# Patient Record
Sex: FEMALE | Race: WHITE | NOT HISPANIC OR LATINO | Employment: OTHER | URBAN - METROPOLITAN AREA
[De-identification: names, ages, dates, MRNs, and addresses within clinical notes are randomized per-mention and may not be internally consistent; named-entity substitution may affect disease eponyms.]

---

## 2022-01-13 ENCOUNTER — PATIENT ROUNDING (BHMG ONLY) (OUTPATIENT)
Dept: FAMILY MEDICINE CLINIC | Facility: CLINIC | Age: 52
End: 2022-01-13

## 2022-01-13 ENCOUNTER — OFFICE VISIT (OUTPATIENT)
Dept: FAMILY MEDICINE CLINIC | Facility: CLINIC | Age: 52
End: 2022-01-13

## 2022-01-13 VITALS
TEMPERATURE: 98.7 F | HEIGHT: 59 IN | DIASTOLIC BLOOD PRESSURE: 62 MMHG | HEART RATE: 70 BPM | BODY MASS INDEX: 23.18 KG/M2 | WEIGHT: 115 LBS | SYSTOLIC BLOOD PRESSURE: 122 MMHG | OXYGEN SATURATION: 96 %

## 2022-01-13 DIAGNOSIS — M41.05 INFANTILE IDIOPATHIC SCOLIOSIS OF THORACOLUMBAR REGION: ICD-10-CM

## 2022-01-13 DIAGNOSIS — E03.9 HYPOTHYROIDISM, UNSPECIFIED TYPE: ICD-10-CM

## 2022-01-13 DIAGNOSIS — K58.0 IRRITABLE BOWEL SYNDROME WITH DIARRHEA: ICD-10-CM

## 2022-01-13 DIAGNOSIS — Z76.89 ESTABLISHING CARE WITH NEW DOCTOR, ENCOUNTER FOR: ICD-10-CM

## 2022-01-13 DIAGNOSIS — E53.8 VITAMIN B 12 DEFICIENCY: ICD-10-CM

## 2022-01-13 DIAGNOSIS — F41.9 ANXIETY: Primary | ICD-10-CM

## 2022-01-13 PROCEDURE — 99204 OFFICE O/P NEW MOD 45 MIN: CPT | Performed by: NURSE PRACTITIONER

## 2022-01-13 RX ORDER — DICYCLOMINE HYDROCHLORIDE 10 MG/1
10 CAPSULE ORAL
COMMUNITY

## 2022-01-13 RX ORDER — LEVOTHYROXINE SODIUM 0.05 MG/1
50 TABLET ORAL DAILY
COMMUNITY
End: 2022-01-14 | Stop reason: DRUGHIGH

## 2022-01-13 RX ORDER — BUSPIRONE HYDROCHLORIDE 5 MG/1
5 TABLET ORAL 3 TIMES DAILY
Qty: 90 TABLET | Refills: 0 | Status: SHIPPED | OUTPATIENT
Start: 2022-01-13 | End: 2022-03-24 | Stop reason: SDUPTHER

## 2022-01-13 NOTE — ASSESSMENT & PLAN NOTE
Long history of hypothyroidism. Reports she has been on 50 mcg of Synthroid for several years. We will assess TSH, T3, T4.

## 2022-01-13 NOTE — ASSESSMENT & PLAN NOTE
Starting patient on BuSpar 5 mg 3 times daily. Instructed her to call [facility] and set up an appointment for counseling. Patient to follow back up in 4 weeks or sooner if needed

## 2022-01-13 NOTE — ASSESSMENT & PLAN NOTE
Patient has a history of vitamin B12 deficiency. We will assess that level and offer treatment as indicated

## 2022-01-13 NOTE — PROGRESS NOTES
January 13, 2022    Hello, may I speak with Lizzie RANGEL?    My name is Jie      I am  with ANAI PALM  [facility]  [address]  [place] [address].    Before we get started may I verify your date of birth? 1970    I am calling to officially welcome you to our practice and ask about your recent visit. Is this a good time to talk? yes    Tell me about your visit with us. What things went well?  Everything went well.  It was a good visit.       We're always looking for ways to make our patients' experiences even better. Do you have recommendations on ways we may improve?  no     Overall were you satisfied with your first visit to our practice? yes       I appreciate you taking the time to speak with me today. Is there anything else I can do for you? no      Thank you, and have a great day.

## 2022-01-13 NOTE — PROGRESS NOTES
"Chief Complaint  Mental Health Problem    Subjective          Lizzie RANGEL presents to [facility]      Lizzie is a 51 year old female patient who is here to establish care. She has a history of urinary incontinence, hypothyroidism, IBS, hemorrhoids, and headaches. She had a hysterectomy around ,  in , and hernia repair in  or . She is disabled and has been so since an infant. 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 She reports she was 24-hour care and home until she was about 4 years old. Family history is noted in the chart, she is up-to-date on her flu vaccine. She had a colonoscopy in  when she was in [place]. She has genital herpes. She has not had an outbreak in years. Has a hx of shingles. She gets frequent UTIs due to her frequent lose bowels. 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    Lizzie reports that she left [place] to come here after her relationship ended. She is now living with her mother. She hasn't lived with her since she was 16 years old. She is having some issues with being around her mother and things she thought \"were put to rest\" from her childhood are coming back to her.  Pt is tearful. Anxiety is \"up to hear\". She is \"irritable, angry, having panic attacks\". She gets headaches when she is anxious due to having a trach and tachypnea when anxiety is present. She does report palpitations on occasion. Hep C hx and \"negative since \" - unsure how it was contracted however, she had numerous blood transfusions as an infant, tattoos, etc.    She denies depression, SI, self harm, or want to harm others. She has taken depression medication in the past for her anxiety however, the depression worse. She wants to avoid depression medication all together. She did attend therapy after this and was doing well until she moved in with her mother. She has tried " "Wellbutrin beforet. She did not like how it made her feel. She is seeking a referral to [facility] to set up counseling sessions. She enjoyed going to counseling in the past as it helped her learn good behaviors and techniques to manage her anxiety. She is also seeking medication to help with the symptoms of anxiety but wants to avoid anything that can be habit-forming.    Her thyroid has been under good control in years prior according to patient. She denies any hair loss or heat/cold intolerances.    IBS is fairly frequent but well controlled with dicyclomine. She has no complaints for IBS today.      Objective     Vital Signs:   /62 (BP Location: Left arm, Patient Position: Sitting, Cuff Size: Adult)   Pulse 70   Temp 98.7 °F (37.1 °C) (Oral)   Ht 149.9 cm (59\")   Wt 52.2 kg (115 lb)   SpO2 96%   BMI 23.23 kg/m²           Physical Exam  Vitals reviewed.   Constitutional:       Appearance: Normal appearance. She is well-developed.      Comments: Wearing a face mask     HENT:      Head: Normocephalic and atraumatic.      Nose: Nose normal.   Eyes:      Conjunctiva/sclera: Conjunctivae normal.   Neck:      Thyroid: No thyroid mass, thyromegaly or thyroid tenderness.     Cardiovascular:      Rate and Rhythm: Normal rate and regular rhythm.      Pulses: Normal pulses.      Heart sounds: Normal heart sounds.   Pulmonary:      Effort: Pulmonary effort is normal. No respiratory distress.      Breath sounds: No wheezing, rhonchi or rales.   Abdominal:      General: Abdomen is flat. A surgical scar is present. Bowel sounds are normal.      Tenderness: There is no abdominal tenderness.   Musculoskeletal:         General: Normal range of motion.      Cervical back: Normal range of motion and neck supple. No edema or erythema.   Lymphadenopathy:      Head:      Right side of head: No submental, submandibular, tonsillar, preauricular, posterior auricular or occipital adenopathy.      Left side of head: No " submental, submandibular, tonsillar, preauricular, posterior auricular or occipital adenopathy.      Cervical: No cervical adenopathy.      Right cervical: No superficial, deep or posterior cervical adenopathy.     Left cervical: No superficial, deep or posterior cervical adenopathy.   Skin:     General: Skin is warm and dry.      Findings: No rash.   Neurological:      General: No focal deficit present.      Mental Status: She is alert and oriented to person, place, and time.   Psychiatric:         Behavior: Behavior normal.        Result Review :                       Assessment and Plan      Diagnoses and all orders for this visit:    1. Anxiety (Primary)  Assessment & Plan:  Starting patient on BuSpar 5 mg 3 times daily. Instructed her to [facility] and set up an appointment for counseling. Patient to follow back up in 4 weeks or sooner if needed    Orders:  -     Comprehensive metabolic panel  -     CBC (No Diff)    2. Establishing care with new doctor, encounter for  -     Comprehensive metabolic panel  -     CBC (No Diff)    3. Irritable bowel syndrome with diarrhea  Assessment & Plan:  History of irritable bowel that is controlled with dicyclomine    Orders:  -     CBC (No Diff)    4. Hypothyroidism, unspecified type  Assessment & Plan:  Long history of hypothyroidism. Reports she has been on 50 mcg of Synthroid for several years. We will assess TSH, T3, T4.    Orders:  -     TSH  -     T3, free  -     T4  -     CBC (No Diff)    5. Vitamin B 12 deficiency  Assessment & Plan:  Patient has a history of vitamin B12 deficiency. We will assess that level and offer treatment as indicated    Orders:  -     Vitamin B12  -     CBC (No Diff)    6. Infantile idiopathic scoliosis of thoracolumbar region    Other orders  -     Cancel: COVID-19,[facility] ROUTINE, NP/OP SWAB IN TRANSPORT MEDIA OR ESWAB 72 HR TAT - Swab, Nasopharynx  -     busPIRone (BUSPAR) 5 MG tablet; Take 1 tablet by mouth 3 (Three) Times a Day.   Dispense: 90 tablet; Refill: 0          Follow Up       No follow-ups on file.      Patient was given instructions and counseling regarding her condition or for health maintenance advice. Please see specific information pulled into the AVS if appropriate.     Ethel Blackman, APRN1/13/202215:01 EST  This note has been electronically signed

## 2022-01-14 DIAGNOSIS — E03.9 HYPOTHYROIDISM, UNSPECIFIED TYPE: Primary | ICD-10-CM

## 2022-01-14 LAB
ALBUMIN SERPL-MCNC: 4.8 G/DL (ref 3.8–4.9)
ALBUMIN/GLOB SERPL: 1.7 {RATIO} (ref 1.2–2.2)
ALP SERPL-CCNC: 80 IU/L (ref 44–121)
ALT SERPL-CCNC: 18 IU/L (ref 0–32)
AST SERPL-CCNC: 20 IU/L (ref 0–40)
BILIRUB SERPL-MCNC: <0.2 MG/DL (ref 0–1.2)
BUN SERPL-MCNC: 16 MG/DL (ref 6–24)
BUN/CREAT SERPL: 20 (ref 9–23)
CALCIUM SERPL-MCNC: 10 MG/DL (ref 8.7–10.2)
CHLORIDE SERPL-SCNC: 103 MMOL/L (ref 96–106)
CO2 SERPL-SCNC: 25 MMOL/L (ref 20–29)
CREAT SERPL-MCNC: 0.81 MG/DL (ref 0.57–1)
ERYTHROCYTE [DISTWIDTH] IN BLOOD BY AUTOMATED COUNT: 13.4 % (ref 11.7–15.4)
GLOBULIN SER CALC-MCNC: 2.9 G/DL (ref 1.5–4.5)
GLUCOSE SERPL-MCNC: 93 MG/DL (ref 65–99)
HCT VFR BLD AUTO: 43.5 % (ref 34–46.6)
HGB BLD-MCNC: 14.7 G/DL (ref 11.1–15.9)
MCH RBC QN AUTO: 29.1 PG (ref 26.6–33)
MCHC RBC AUTO-ENTMCNC: 33.8 G/DL (ref 31.5–35.7)
MCV RBC AUTO: 86 FL (ref 79–97)
PLATELET # BLD AUTO: 341 X10E3/UL (ref 150–450)
POTASSIUM SERPL-SCNC: 4.6 MMOL/L (ref 3.5–5.2)
PROT SERPL-MCNC: 7.7 G/DL (ref 6–8.5)
RBC # BLD AUTO: 5.05 X10E6/UL (ref 3.77–5.28)
SODIUM SERPL-SCNC: 144 MMOL/L (ref 134–144)
T3FREE SERPL-MCNC: 2 PG/ML (ref 2–4.4)
T4 SERPL-MCNC: 5.2 UG/DL (ref 4.5–12)
TSH SERPL DL<=0.005 MIU/L-ACNC: 20.2 UIU/ML (ref 0.45–4.5)
VIT B12 SERPL-MCNC: 327 PG/ML (ref 232–1245)
WBC # BLD AUTO: 5.5 X10E3/UL (ref 3.4–10.8)

## 2022-01-14 RX ORDER — LEVOTHYROXINE SODIUM 0.07 MG/1
75 TABLET ORAL DAILY
Qty: 30 TABLET | Refills: 1 | Status: SHIPPED | OUTPATIENT
Start: 2022-01-14 | End: 2022-02-28

## 2022-01-14 NOTE — PROGRESS NOTES
With that I will need her to return in 6 weeks so we can check her thyroid levels.  I can place the order and she can stop by or she can have a visit and then we can check labs after the visit

## 2022-01-14 NOTE — PROGRESS NOTES
Please inform Lizzie that I am going to be increasing her Synthroid to 75 mcg daily.  Her TSH was at a level of 20.2.  Could you ask her if her levels have been this high before in the past and if she has any additional information on her hypothyroidism.

## 2022-01-14 NOTE — PROGRESS NOTES
That would be fine. I did send in the higher dose of 75 mcg to pharmacy and do prefer her to take it over the 50 mcg if you could please advise her to do so.

## 2022-02-07 ENCOUNTER — TELEPHONE (OUTPATIENT)
Dept: FAMILY MEDICINE CLINIC | Facility: CLINIC | Age: 52
End: 2022-02-07

## 2022-02-07 NOTE — TELEPHONE ENCOUNTER
HUB TO READ     LEFT MESSAGE     PATIENT NEEDS TO MAKE AN APPOINTMENT TO BE CHECKED OUT. DON'T PRESCRIBE ANTIBIOTICS USUALLY WITH OUT BEING SINCE FIRST.

## 2022-02-07 NOTE — TELEPHONE ENCOUNTER
Caller: Lizzie Campos    Relationship: Self    Best call back number: [phone]    What medication are you requesting: Z-SHAWANDA OR WHATEVER MEDICATION HER PCP RECOMMENDS    What are your current symptoms: GREEN MUCUS SECRETIONS, WINDED WHEN TALKING     How long have you been experiencing symptoms:     Have you had these symptoms before:    [x] Yes  [] No    Have you been treated for these symptoms before:   [x] Yes  [] No    If a prescription is needed, what is your preferred pharmacy and phone number: [address] [phone] [facility] [phone] [facility]       EDITED TO ADD: THE PATIENT ADDED THAT SHE IS ALSO EXPERIENCING A HEADACHE, SORE THROAT, AND CHEST CONGESTION. THE PATIENT WOULD LIKE TO KNOW IF SHE SHOULD BE TESTED FOR COVID-19.

## 2022-02-28 ENCOUNTER — OFFICE VISIT (OUTPATIENT)
Dept: FAMILY MEDICINE CLINIC | Facility: CLINIC | Age: 52
End: 2022-02-28

## 2022-02-28 VITALS
OXYGEN SATURATION: 96 % | HEIGHT: 59 IN | DIASTOLIC BLOOD PRESSURE: 77 MMHG | SYSTOLIC BLOOD PRESSURE: 118 MMHG | WEIGHT: 117.6 LBS | RESPIRATION RATE: 18 BRPM | HEART RATE: 89 BPM | TEMPERATURE: 98.6 F | BODY MASS INDEX: 23.71 KG/M2

## 2022-02-28 DIAGNOSIS — Z00.00 ENCOUNTER FOR ANNUAL WELLNESS VISIT (AWV) IN MEDICARE PATIENT: Primary | ICD-10-CM

## 2022-02-28 DIAGNOSIS — M54.9 CHRONIC BACK PAIN, UNSPECIFIED BACK LOCATION, UNSPECIFIED BACK PAIN LATERALITY: ICD-10-CM

## 2022-02-28 DIAGNOSIS — Z43.0 TRACHEOSTOMY CARE: ICD-10-CM

## 2022-02-28 DIAGNOSIS — Z11.59 ENCOUNTER FOR HEPATITIS C SCREENING TEST FOR LOW RISK PATIENT: ICD-10-CM

## 2022-02-28 DIAGNOSIS — E03.9 HYPOTHYROIDISM, UNSPECIFIED TYPE: ICD-10-CM

## 2022-02-28 DIAGNOSIS — G89.29 CHRONIC BACK PAIN, UNSPECIFIED BACK LOCATION, UNSPECIFIED BACK PAIN LATERALITY: ICD-10-CM

## 2022-02-28 DIAGNOSIS — M41.00 INFANTILE IDIOPATHIC SCOLIOSIS, UNSPECIFIED SPINAL REGION: ICD-10-CM

## 2022-02-28 DIAGNOSIS — F41.9 ANXIETY: ICD-10-CM

## 2022-02-28 PROCEDURE — 1170F FXNL STATUS ASSESSED: CPT | Performed by: NURSE PRACTITIONER

## 2022-02-28 PROCEDURE — 1159F MED LIST DOCD IN RCRD: CPT | Performed by: NURSE PRACTITIONER

## 2022-02-28 PROCEDURE — G0439 PPPS, SUBSEQ VISIT: HCPCS | Performed by: NURSE PRACTITIONER

## 2022-02-28 PROCEDURE — 96160 PT-FOCUSED HLTH RISK ASSMT: CPT | Performed by: NURSE PRACTITIONER

## 2022-02-28 RX ORDER — LEVOTHYROXINE SODIUM 88 UG/1
88 TABLET ORAL DAILY
Qty: 30 TABLET | Refills: 1 | Status: SHIPPED | OUTPATIENT
Start: 2022-02-28 | End: 2022-05-03

## 2022-02-28 NOTE — ASSESSMENT & PLAN NOTE
Patient TSH at 6.63. I am increasing her Synthroid to 88 mcg. Patient will remain on this and follow-up in 2 to 3 months. We will reevaluate her TSH then

## 2022-02-28 NOTE — PROGRESS NOTES
"The ABCs of the Annual Wellness Visit  Subsequent Medicare Wellness Visit    Chief Complaint   Patient presents with   • Medicare Wellness-subsequent   • Anxiety   • Hypothyroidism      Subjective    History of Present Illness:  Lizzie Campos is a 51 y.o. female who presents for a Subsequent Medicare Wellness Visit.    The following portions of the patient's history were reviewed and   updated as appropriate: allergies, current medications, past family history, past medical history, past social history, past surgical history and problem list.    Compared to one year ago, the patient feels her physical   health is the same.    Compared to one year ago, the patient feels her mental   health is the same.    Remodeling going on in March at her mothers house.     She did do first therapy session lat week to \"meet & greet\" and feeling out paperwork. Her next visit is at the end of March. She is going to request \"every two week visits\" to get through some things that are bothering her.     She takes BuSpar only once daily for her anxiety. However, at times she has taken a second dose as \"my mother can be difficult sometimes\". She denies any chest pain, palpitations, constipation, altered mentation, SI/HI, or want to self-harm.    She has a mammogram in 2021 - and reports no abnormalities.   She reports having a colonoscopy in the past. We will request records to find out when.     Patient has history of scoliosis and chronic back pain. She previously treated with medicinal marijuana however, now that she lives in [place] she only treats with Tylenol.      Recent Hospitalizations:  She was not admitted to the hospital during the last year.       Current Medical Providers:  Patient Care Team:  Ethel Blackman APRN as PCP - General (Nurse Practitioner)    Outpatient Medications Prior to Visit   Medication Sig Dispense Refill   • busPIRone (BUSPAR) 5 MG tablet Take 1 tablet by mouth 3 (Three) Times a Day. 90 tablet 0 "   • dicyclomine (BENTYL) 10 MG capsule Take 10 mg by mouth 4 (Four) Times a Day Before Meals & at Bedtime.     • Mirabegron ER (MYRBETRIQ) 25 MG tablet sustained-release 24 hour 24 hr tablet Take 25 mg by mouth Daily.     • levothyroxine (Synthroid) 75 MCG tablet Take 1 tablet by mouth Daily. 30 tablet 1     No facility-administered medications prior to visit.       No opioid medication identified on active medication list. I have reviewed chart for other potential  high risk medication/s and harmful drug interactions in the elderly.          Aspirin is not on active medication list.  Aspirin use is not indicated based on review of current medical condition/s. Risk of harm outweighs potential benefits.  .    Patient Active Problem List   Diagnosis   • Infantile idiopathic scoliosis of thoracolumbar region   • Anxiety   • Establishing care with new doctor, encounter for   • Vitamin B 12 deficiency   • Irritable bowel syndrome with diarrhea   • Hypothyroidism   • Encounter for annual wellness visit (AWV) in Medicare patient   • Encounter for hepatitis C screening test for low risk patient   • Tracheostomy care (HCC)   • Infantile idiopathic scoliosis   • Chronic back pain     Advance Care Planning  Advance Directive is not on file.  ACP discussion was held with the patient during this visit. Patient has an advance directive (not in EMR), copy requested.    Review of Systems   Constitutional: Negative.    HENT: Negative.    Eyes: Negative.         Wearing glasses     Respiratory: Negative.         Permanent trach     Cardiovascular: Negative.    Gastrointestinal: Positive for diarrhea.   Endocrine: Negative.    Genitourinary: Positive for frequency and urgency.   Musculoskeletal: Negative.    Skin: Negative.    Allergic/Immunologic: Negative.    Neurological: Negative.    Psychiatric/Behavioral: Negative.         Objective    Vitals:    02/28/22 1318   BP: 118/77   BP Location: Right arm   Patient Position: Sitting  "  Cuff Size: Adult   Pulse: 89   Resp: 18   Temp: 98.6 °F (37 °C)   TempSrc: Infrared   SpO2: 96%   Weight: 53.3 kg (117 lb 9.6 oz)   Height: 149.9 cm (59\")   PainSc: 0-No pain     BMI Readings from Last 1 Encounters:   02/28/22 23.75 kg/m²   BMI is within normal parameters. No follow-up required.    Does the patient have evidence of cognitive impairment? No    Physical Exam  Vitals reviewed.   Constitutional:       Appearance: Normal appearance. She is well-developed.      Comments: Wearing a face mask     HENT:      Head: Normocephalic and atraumatic.      Nose: Nose normal.      Mouth/Throat:      Mouth: Mucous membranes are moist.      Pharynx: Oropharynx is clear.   Eyes:      Conjunctiva/sclera: Conjunctivae normal.   Cardiovascular:      Rate and Rhythm: Normal rate and regular rhythm.      Pulses: Normal pulses.      Heart sounds: Normal heart sounds.   Pulmonary:      Effort: Pulmonary effort is normal.      Breath sounds: Normal breath sounds.      Comments: permament trach    Abdominal:      General: Bowel sounds are normal.      Palpations: Abdomen is soft.   Musculoskeletal:         General: Normal range of motion.      Cervical back: Normal range of motion and neck supple. No tenderness.      Thoracic back: Scoliosis present.      Lumbar back: Scoliosis present.   Skin:     General: Skin is warm and dry.      Findings: No rash.   Neurological:      Mental Status: She is alert and oriented to person, place, and time.   Psychiatric:         Behavior: Behavior normal.                 HEALTH RISK ASSESSMENT    Smoking Status:  Social History     Tobacco Use   Smoking Status Never Smoker   Smokeless Tobacco Never Used     Alcohol Consumption:  Social History     Substance and Sexual Activity   Alcohol Use Not Currently    Comment: QUIT DRINKING IN 2013     Fall Risk Screen:    Critical access hospital Fall Risk Assessment has not been completed.    Depression Screening:  PHQ-2/PHQ-9 Depression Screening 2/28/2022   Little " interest or pleasure in doing things 0   Feeling down, depressed, or hopeless 1   Total Score 1       Health Habits and Functional and Cognitive Screening:  Functional & Cognitive Status 2/28/2022   Do you have difficulty preparing food and eating? No   Do you have difficulty bathing yourself, getting dressed or grooming yourself? No   Do you have difficulty using the toilet? No   Do you have difficulty moving around from place to place? No   Do you have trouble with steps or getting out of a bed or a chair? Yes   Current Diet Limited Junk Food   Dental Exam Not up to date   Eye Exam Not up to date   Exercise (times per week) 0 times per week   Current Exercises Include No Regular Exercise   Do you need help using the phone?  No   Are you deaf or do you have serious difficulty hearing?  No   Do you need help with transportation? No   Do you need help shopping? No   Do you need help preparing meals?  No   Do you need help with housework?  No   Do you need help with laundry? Yes   Do you need help taking your medications? No   Do you need help managing money? No   Do you ever drive or ride in a car without wearing a seat belt? No   Have you felt unusual stress, anger or loneliness in the last month? Yes   Who do you live with? Other   If you need help, do you have trouble finding someone available to you? Yes   Have you been bothered in the last four weeks by sexual problems? No   Do you have difficulty concentrating, remembering or making decisions? No       Age-appropriate Screening Schedule:  Refer to the list below for future screening recommendations based on patient's age, sex and/or medical conditions. Orders for these recommended tests are listed in the plan section. The patient has been provided with a written plan.    Health Maintenance   Topic Date Due   • ZOSTER VACCINE (1 of 2) Never done   • MAMMOGRAM  06/02/2022   • TDAP/TD VACCINES (2 - Td or Tdap) 12/29/2030   • INFLUENZA VACCINE  Completed               Assessment/Plan   CMS Preventative Services Quick Reference  Risk Factors Identified During Encounter  Immunizations Discussed/Encouraged (specific Immunizations; Tdap and Zoster  Urinary Incontinence  Fecal incontinence on occassion  The above risks/problems have been discussed with the patient.  Follow up actions/plans if indicated are seen below in the Assessment/Plan Section.  Pertinent information has been shared with the patient in the After Visit Summary.    Diagnoses and all orders for this visit:    1. Encounter for annual wellness visit (AWV) in Medicare patient (Primary)    2. Hypothyroidism, unspecified type  Assessment & Plan:  Patient TSH at 6.63. I am increasing her Synthroid to 88 mcg. Patient will remain on this and follow-up in 2 to 3 months. We will reevaluate her TSH then      3. Anxiety    4. Chronic back pain, unspecified back location, unspecified back pain laterality  Assessment & Plan:  Patient has longstanding back pain. We will obtain imaging to assess for underlying issues.    Orders:  -     CT Cervical Spine Without Contrast; Future  -     CT Thoracic Spine Without Contrast; Future  -     CT Lumbar Spine Without Contrast; Future    5. Infantile idiopathic scoliosis, unspecified spinal region  Assessment & Plan:  Patient complains of generalized back pain, she has a history of scoliosis. She is requesting imaging to evaluate the progression of her disease. Patient reports it has been many years since her back has been evaluated.    Orders:  -     CT Cervical Spine Without Contrast; Future  -     CT Thoracic Spine Without Contrast; Future  -     CT Lumbar Spine Without Contrast; Future    6. Tracheostomy care (HCC)  Assessment & Plan:  Patient has permanent trach. She is requesting referral to ENT. She has no complications or issues at this time.    Orders:  -     Ambulatory Referral to ENT (Otolaryngology)    7. Encounter for hepatitis C screening test for low risk patient  Assessment  & Plan:  Patient has a history of treated hep C. We will obtain that lab today.    Orders:  -     Hepatitis C antibody    Other orders  -     levothyroxine (Synthroid) 88 MCG tablet; Take 1 tablet by mouth Daily.  Dispense: 30 tablet; Refill: 1      Follow Up:   No follow-ups on file.     An After Visit Summary and PPPS were made available to the patient.

## 2022-02-28 NOTE — ASSESSMENT & PLAN NOTE
Patient complains of generalized back pain, she has a history of scoliosis. She is requesting imaging to evaluate the progression of her disease. Patient reports it has been many years since her back has been evaluated.

## 2022-02-28 NOTE — ASSESSMENT & PLAN NOTE
Patient has permanent trach. She is requesting referral to ENT. She has no complications or issues at this time.

## 2022-03-01 ENCOUNTER — TELEPHONE (OUTPATIENT)
Dept: FAMILY MEDICINE CLINIC | Facility: CLINIC | Age: 52
End: 2022-03-01

## 2022-03-01 DIAGNOSIS — R76.8 HEPATITIS C ANTIBODY POSITIVE IN BLOOD: Primary | ICD-10-CM

## 2022-03-01 LAB — HCV AB S/CO SERPL IA: 6.8 S/CO RATIO (ref 0–0.9)

## 2022-03-01 NOTE — TELEPHONE ENCOUNTER
HUB TO READ     MY CHART MESSAGE     Hep C antibodies are positive. This only indicates she has been with hepatitis C. We will monitor for now but will check to ensure she has cleared the Hep C and it did not ever return.

## 2022-03-15 ENCOUNTER — HOSPITAL ENCOUNTER (OUTPATIENT)
Dept: CT IMAGING | Facility: HOSPITAL | Age: 52
Discharge: HOME | End: 2022-03-15
Admitting: NURSE PRACTITIONER

## 2022-03-15 DIAGNOSIS — M54.9 CHRONIC BACK PAIN, UNSPECIFIED BACK LOCATION, UNSPECIFIED BACK PAIN LATERALITY: ICD-10-CM

## 2022-03-15 DIAGNOSIS — G89.29 CHRONIC BACK PAIN, UNSPECIFIED BACK LOCATION, UNSPECIFIED BACK PAIN LATERALITY: ICD-10-CM

## 2022-03-15 DIAGNOSIS — M41.00 INFANTILE IDIOPATHIC SCOLIOSIS, UNSPECIFIED SPINAL REGION: ICD-10-CM

## 2022-03-15 PROCEDURE — 72125 CT NECK SPINE W/O DYE: CPT

## 2022-03-15 PROCEDURE — 72131 CT LUMBAR SPINE W/O DYE: CPT

## 2022-03-15 PROCEDURE — 72128 CT CHEST SPINE W/O DYE: CPT

## 2022-03-17 DIAGNOSIS — E03.9 HYPOTHYROIDISM, UNSPECIFIED TYPE: Primary | ICD-10-CM

## 2022-03-24 RX ORDER — BUSPIRONE HYDROCHLORIDE 5 MG/1
5 TABLET ORAL 3 TIMES DAILY
Qty: 90 TABLET | Refills: 0 | Status: SHIPPED | OUTPATIENT
Start: 2022-03-24 | End: 2022-04-28 | Stop reason: DRUGHIGH

## 2022-03-24 NOTE — TELEPHONE ENCOUNTER
Caller: Lizzie Campos    Relationship: Self    Best call back number: [phone] (H)    Requested Prescriptions:   Requested Prescriptions     Pending Prescriptions Disp Refills   • busPIRone (BUSPAR) 5 MG tablet 90 tablet 0     Sig: Take 1 tablet by mouth 3 (Three) Times a Day.        Pharmacy where request should be sent: [facility]/PHARMACY #[id number] [facility] [address] [facility] [address] - [phone]  - [phone] FX     Additional details provided by patient: PATIENT NEEDS REFILLED THIS ONE TIME AT THIS PHARMACY ASAP    Does the patient have less than a 3 day supply:  [x] Yes  [] No    Shyanne Bar   03/24/22 15:36 EDT

## 2022-03-28 DIAGNOSIS — E03.9 HYPOTHYROIDISM, UNSPECIFIED TYPE: Primary | ICD-10-CM

## 2022-04-27 LAB
HCV RNA SERPL NAA+PROBE-ACNC: NORMAL IU/ML
TEST INFORMATION: NORMAL

## 2022-04-28 ENCOUNTER — OFFICE VISIT (OUTPATIENT)
Dept: FAMILY MEDICINE CLINIC | Facility: CLINIC | Age: 52
End: 2022-04-28

## 2022-04-28 VITALS
OXYGEN SATURATION: 98 % | HEART RATE: 91 BPM | TEMPERATURE: 98.5 F | WEIGHT: 119.2 LBS | BODY MASS INDEX: 24.03 KG/M2 | SYSTOLIC BLOOD PRESSURE: 108 MMHG | HEIGHT: 59 IN | DIASTOLIC BLOOD PRESSURE: 68 MMHG

## 2022-04-28 DIAGNOSIS — E03.9 HYPOTHYROIDISM, UNSPECIFIED TYPE: Primary | ICD-10-CM

## 2022-04-28 DIAGNOSIS — F41.9 ANXIETY: ICD-10-CM

## 2022-04-28 PROBLEM — Z11.59 ENCOUNTER FOR HEPATITIS C SCREENING TEST FOR LOW RISK PATIENT: Status: RESOLVED | Noted: 2022-02-28 | Resolved: 2022-04-28

## 2022-04-28 PROCEDURE — 99213 OFFICE O/P EST LOW 20 MIN: CPT | Performed by: NURSE PRACTITIONER

## 2022-04-28 RX ORDER — BUSPIRONE HYDROCHLORIDE 10 MG/1
10 TABLET ORAL 3 TIMES DAILY
Qty: 90 TABLET | Refills: 2 | Status: SHIPPED | OUTPATIENT
Start: 2022-04-28 | End: 2022-10-21 | Stop reason: SDUPTHER

## 2022-04-28 NOTE — ASSESSMENT & PLAN NOTE
TSH is stable and doing well on 88 mcg.  We will have patient follow-up in 3 months for evaluation.

## 2022-04-28 NOTE — PROGRESS NOTES
"Chief Complaint  Anxiety and Hypothyroidism    Subjective          Lizzie Campos presents to [facility]        History of Present Illness    Lizzie is a 51-year-old female patient who presents today to follow-up on her anxiety and hypothyroidism.    We did draw labs a few days ago to check thyroid which has improved greatly and is now at 2.670 and TSH.  She is currently on 88 mcg of levothyroxine.  She denies any fatigue, palpitations, chest pain, heat or cold intolerances, hair loss.    Patient's anxiety was noted in January shortly after she moved in with her mother..  We started her on BuSpar 5mg 3 times a day.  She is still stressed out living with her mother that she is working on getting Medicaid and section 8.  She has an appointment on May 2 to start this process.  She reports \"I cannot live with my mother anymore \".  She indicates that they butt heads off and on using her patient lives.  Additionally, she reports her mother stresses her out and likes to cause drama in the house.  Patient will typically go to her room to calm down or remove herself from the situation however, \"I am tired of going to my room\".  She would like to increase the dose of BuSpar due to heightened anxiety after recent renovation and still working on the house.     She is still currently looking for houses that her section 8 approved.  She is worried about her animals.  She has 2 dogs 1 small and 1 large and indicates that she might need paperwork for emotional support animal for her anxiety.  I have no issue providing patient with this documentation if needed for housing situation.      Objective     Vital Signs:   /68 (BP Location: Left arm, Patient Position: Sitting, Cuff Size: Adult)   Pulse 91   Temp 98.5 °F (36.9 °C) (Oral)   Ht 149.9 cm (59.02\")   Wt 54.1 kg (119 lb 3.2 oz)   SpO2 98%   BMI 24.06 kg/m²           Physical Exam  Vitals reviewed.   Constitutional:       " Appearance: Normal appearance. She is well-developed.      Comments: Wearing a face mask     HENT:      Head: Normocephalic and atraumatic.      Nose: Nose normal.      Mouth/Throat:      Lips: Pink. No lesions.      Mouth: Mucous membranes are moist.      Pharynx: Oropharynx is clear.   Eyes:      Conjunctiva/sclera: Conjunctivae normal.   Cardiovascular:      Rate and Rhythm: Normal rate and regular rhythm.      Pulses: Normal pulses.      Heart sounds: Normal heart sounds.   Pulmonary:      Effort: Pulmonary effort is normal.      Breath sounds: Normal breath sounds.      Comments: permament trach    Abdominal:      General: Bowel sounds are normal.      Palpations: Abdomen is soft.   Musculoskeletal:         General: Normal range of motion.      Cervical back: Normal range of motion and neck supple. No tenderness.      Thoracic back: Scoliosis present.      Lumbar back: Scoliosis present.   Skin:     General: Skin is warm and dry.      Findings: No rash.   Neurological:      Mental Status: She is alert and oriented to person, place, and time.   Psychiatric:         Behavior: Behavior normal.                Result Review :                                   Assessment and Plan      Diagnoses and all orders for this visit:    1. Hypothyroidism, unspecified type (Primary)  Assessment & Plan:  TSH is stable and doing well on 88 mcg.  We will have patient follow-up in 3 months for evaluation.      2. Anxiety  Assessment & Plan:  Anxiety was doing well but has worsened with renovation and living situation with mother.  Patient is working on moving out of her mother's home and getting her own residence.  Mother is causing patient additional anxiety and stress.  She would like to go up slightly on her BuSpar      Other orders  -     busPIRone (BUSPAR) 10 MG tablet; Take 1 tablet by mouth 3 (Three) Times a Day.  Dispense: 90 tablet; Refill: 2          Follow Up       No follow-ups on file.      Patient was given  instructions and counseling regarding her condition or for health maintenance advice. Please see specific information pulled into the AVS if appropriate.     Ethel Blackman, APRN4/28/202214:29 EDT  This note has been electronically signed

## 2022-04-28 NOTE — ASSESSMENT & PLAN NOTE
Anxiety was doing well but has worsened with renovation and living situation with mother.  Patient is working on moving out of her mother's home and getting her own residence.  Mother is causing patient additional anxiety and stress.  She would like to go up slightly on her BuSpar

## 2022-05-03 RX ORDER — LEVOTHYROXINE SODIUM 88 UG/1
TABLET ORAL
Qty: 30 TABLET | Refills: 0 | Status: SHIPPED | OUTPATIENT
Start: 2022-05-03 | End: 2022-10-30 | Stop reason: SDUPTHER

## 2022-05-17 DIAGNOSIS — M79.675 PAIN OF TOE OF LEFT FOOT: Primary | ICD-10-CM

## 2022-05-19 ENCOUNTER — TELEPHONE (OUTPATIENT)
Dept: FAMILY MEDICINE CLINIC | Facility: CLINIC | Age: 52
End: 2022-05-19

## 2022-05-19 NOTE — TELEPHONE ENCOUNTER
I left detailed VM for pt, if she calls back  HUB CAN READ  Please notify Lizzie that her x-ray of the foot was normal.  I do advise her to continue alternating ibuprofen and Tylenol as well as heat and ice to the area.

## 2022-07-08 DIAGNOSIS — E03.9 HYPOTHYROIDISM, UNSPECIFIED TYPE: Primary | ICD-10-CM

## 2022-07-14 ENCOUNTER — OFFICE VISIT (OUTPATIENT)
Dept: FAMILY MEDICINE CLINIC | Facility: CLINIC | Age: 52
End: 2022-07-14

## 2022-07-14 VITALS
WEIGHT: 121.8 LBS | DIASTOLIC BLOOD PRESSURE: 76 MMHG | OXYGEN SATURATION: 98 % | BODY MASS INDEX: 24.56 KG/M2 | HEIGHT: 59 IN | TEMPERATURE: 98.6 F | HEART RATE: 86 BPM | SYSTOLIC BLOOD PRESSURE: 138 MMHG

## 2022-07-14 DIAGNOSIS — S80.12XA CONTUSION OF MULTIPLE SITES OF LEFT LOWER EXTREMITY, INITIAL ENCOUNTER: ICD-10-CM

## 2022-07-14 DIAGNOSIS — M79.605 PAIN IN LEFT LEG: Primary | ICD-10-CM

## 2022-07-14 PROCEDURE — 99213 OFFICE O/P EST LOW 20 MIN: CPT | Performed by: NURSE PRACTITIONER
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
## 2022-07-14 NOTE — ASSESSMENT & PLAN NOTE
Advised patient to continue taking Tylenol OTC.  She may also apply cold application 3-4 times daily 10 to 15 minutes at a time.

## 2022-07-14 NOTE — PROGRESS NOTES
No acute osseous abnormalities, no fractures, no dislocations.  Please advise patient to return if continuation of pain or discomfort.  Otherwise OTC Tylenol should be adequate.  Ice application can help heal the contusions quicker.

## 2022-07-14 NOTE — ASSESSMENT & PLAN NOTE
FINDINGS:  The tibia and fibula are intact. There is normal bone mineralization and  trabeculation. Normal osseous alignment. Patella intact. No joint  effusion. The medial and lateral malleoli are intact.      IMPRESSION:  Negative for acute osseous abnormality.

## 2022-07-25 ENCOUNTER — OFFICE VISIT (OUTPATIENT)
Dept: FAMILY MEDICINE CLINIC | Facility: CLINIC | Age: 52
End: 2022-07-25

## 2022-07-25 DIAGNOSIS — E03.9 HYPOTHYROIDISM, UNSPECIFIED TYPE: Primary | ICD-10-CM

## 2022-07-25 PROCEDURE — 99214 OFFICE O/P EST MOD 30 MIN: CPT | Performed by: NURSE PRACTITIONER

## 2022-07-25 NOTE — ASSESSMENT & PLAN NOTE
Patient TSH level elevated at 9.210.  She had not been taking her medication every day last month.  We will recheck her values in 4 weeks.

## 2022-07-25 NOTE — PROGRESS NOTES
Chief Complaint  Thyroid Problem    Subjective          Lizzie Campos presents to [facility]    You have chosen to receive care through a telephone visit. Do you consent to use a telephone visit for your medical care today? Yes    1320 - Call begin  1328 -  Call end  8 minutes total call    Recheck the     Below kneecap with a bump. Wants checked out when she comes back in.     She is walking fine and no pain.     History of Present Illness       Lizzie is a 51-year-old female patient who presents today via telephone encounter to follow-up on her hypothyroidism.    She is a past medical history of B12 deficiency, hypothyroidism, IBS-D, anxiety, idiopathic infantile scoliosis, and a permanent trach.    We checked her labs 7/21/2022.  Her TSH was 9.210.  She currently takes Euthyrox 88 mcg.  She does report to me that a month ago she was having issues staying consistent with her Euthyrox 88 mcg.  Patient reports she has been pretty diligent since then it has taken daily.  She is having no unwanted symptoms or side effects.  I advised her to follow-up in 1 month so we can see where her TSH is.  Patient also requesting T3 and T4 level.    She is still recovering from her lower extremity injury.  She reports that below her kneecap there is a bump still present.  No significant pain and is walking fine.  She would like me to check this when she comes back to her next visit.  Advised patient that sometimes contusions on the lower extremities can take anywhere from 4 to 8 weeks to heal.  We will take a look when she returns to the office.    Objective     Vital Signs:   There were no vitals taken for this visit.          Physical Exam     Physical exam as this was a telephone encounter.  Patient was able to communicate with ease and without pause.  She denied being in any distress.        Result Review :                                   Assessment and Plan      Diagnoses and all  orders for this visit:    1. Hypothyroidism, unspecified type (Primary)  Assessment & Plan:  Patient TSH level elevated at 9.210.  She had not been taking her medication every day last month.  We will recheck her values in 4 weeks.    Orders:  -     T3, free; Future  -     T4, free; Future  -     TSH; Future          Follow Up       No follow-ups on file.      Patient was given instructions and counseling regarding her condition or for health maintenance advice. Please see specific information pulled into the AVS if appropriate.     Ethel Blackman, APRN7/25/202213:30 EDT  This note has been electronically signed

## 2022-08-19 LAB
T3FREE SERPL-MCNC: 2.3 PG/ML (ref 2–4.4)
T4 FREE SERPL-MCNC: 1.2 NG/DL (ref 0.82–1.77)
TSH SERPL DL<=0.005 MIU/L-ACNC: 2.38 UIU/ML (ref 0.45–4.5)

## 2022-08-22 ENCOUNTER — DOCUMENTATION (OUTPATIENT)
Dept: FAMILY MEDICINE CLINIC | Facility: CLINIC | Age: 52
End: 2022-08-22

## 2022-08-22 NOTE — PROGRESS NOTES
Lizzie TSH is 2.380.  Last month it was 9.210.  Her T4 is 1.20 and her T3 is 2.3. She has had no chest pain, palpitations, hair loss, heat or cold intolerances.  We will continue her on Euthyrox 88 mcg daily.  We will have patient come back in October for 3-month routine follow-up on all other conditions.

## 2022-10-21 ENCOUNTER — OFFICE VISIT (OUTPATIENT)
Dept: FAMILY MEDICINE CLINIC | Facility: CLINIC | Age: 52
End: 2022-10-21

## 2022-10-21 VITALS
HEIGHT: 59 IN | BODY MASS INDEX: 24.8 KG/M2 | HEART RATE: 73 BPM | OXYGEN SATURATION: 96 % | DIASTOLIC BLOOD PRESSURE: 68 MMHG | SYSTOLIC BLOOD PRESSURE: 118 MMHG | TEMPERATURE: 98.3 F | WEIGHT: 123 LBS

## 2022-10-21 DIAGNOSIS — E03.9 HYPOTHYROIDISM, UNSPECIFIED TYPE: ICD-10-CM

## 2022-10-21 DIAGNOSIS — Z43.0 TRACHEOSTOMY CARE: ICD-10-CM

## 2022-10-21 DIAGNOSIS — Z23 NEED FOR INFLUENZA VACCINATION: ICD-10-CM

## 2022-10-21 DIAGNOSIS — M41.00 INFANTILE IDIOPATHIC SCOLIOSIS, UNSPECIFIED SPINAL REGION: ICD-10-CM

## 2022-10-21 DIAGNOSIS — R10.9 FLANK PAIN: Primary | ICD-10-CM

## 2022-10-21 DIAGNOSIS — N30.01 ACUTE CYSTITIS WITH HEMATURIA: ICD-10-CM

## 2022-10-21 DIAGNOSIS — Z12.31 ENCOUNTER FOR SCREENING MAMMOGRAM FOR BREAST CANCER: ICD-10-CM

## 2022-10-21 LAB
BILIRUB BLD-MCNC: NEGATIVE MG/DL
CLARITY, POC: CLEAR
COLOR UR: YELLOW
EXPIRATION DATE: ABNORMAL
GLUCOSE UR STRIP-MCNC: NEGATIVE MG/DL
KETONES UR QL: ABNORMAL
LEUKOCYTE EST, POC: ABNORMAL
Lab: ABNORMAL
NITRITE UR-MCNC: NEGATIVE MG/ML
PH UR: 8 [PH] (ref 5–8)
PROT UR STRIP-MCNC: ABNORMAL MG/DL
RBC # UR STRIP: NEGATIVE /UL
SP GR UR: 1.01 (ref 1–1.03)
UROBILINOGEN UR QL: NORMAL

## 2022-10-21 PROCEDURE — 99214 OFFICE O/P EST MOD 30 MIN: CPT | Performed by: NURSE PRACTITIONER

## 2022-10-21 PROCEDURE — 90686 IIV4 VACC NO PRSV 0.5 ML IM: CPT | Performed by: NURSE PRACTITIONER

## 2022-10-21 PROCEDURE — 81003 URINALYSIS AUTO W/O SCOPE: CPT | Performed by: NURSE PRACTITIONER

## 2022-10-21 PROCEDURE — G0008 ADMIN INFLUENZA VIRUS VAC: HCPCS | Performed by: NURSE PRACTITIONER

## 2022-10-21 RX ORDER — BUSPIRONE HYDROCHLORIDE 10 MG/1
10 TABLET ORAL 3 TIMES DAILY
Qty: 90 TABLET | Refills: 2 | Status: SHIPPED | OUTPATIENT
Start: 2022-10-21 | End: 2023-02-09 | Stop reason: SDUPTHER

## 2022-10-21 RX ORDER — NITROFURANTOIN 25; 75 MG/1; MG/1
100 CAPSULE ORAL 2 TIMES DAILY
Qty: 10 CAPSULE | Refills: 1 | Status: SHIPPED | OUTPATIENT
Start: 2022-10-21 | End: 2023-02-16

## 2022-10-21 NOTE — PROGRESS NOTES
"Chief Complaint  Thyroid Problem, Urinary Tract Infection, and Anxiety    Subjective          Lizzie Campos presents to [facility]      History of Present Illness    Lizzie is a 52 year old female patient who presents today for follow up on Thyroid, anxiety, and dysuria.     She does get diarrhea when she is anxious or nervous. She reports this has occurred recently. She is with dysuria. UA - trace of leukocytes, trace protein, large ketones. Will treat for UTI.     Thyroid - TSH 2.3 in August. In July was 9.210 - pt had     Anxiety - She continues to take BuSpar for her anxiety. It is helping her anxiety.     She did go to eye doctor, Dr. Martins, and she reports they \"found a dead spot behind eye\". Sending to neurology as her peripheral vision is impaired on the left. They are worried something is behind eyeball. She has an appt the day after Thanksgiving. Dr. Stout.     She is with permanent [employer]. She previously used [employer] She has been advised she can no longer order from them due to their location in [place]. She is requesting [employer] supply [employer].     Size is a pediatric #4 Measurement 4mm ID, 6.0 OD, 4.0 PEF Cuffless. Needs a Passey greer clear valve.  Sent to [facility]. We need to know what brand.     She is with a history of scoliosis and wants to see spinal specialist.  Will refer to Dr. Carranza    She has a dental appointment Monday to have her tooth checked. She feels like a nerve is exposed.     She had a mammogram in [place] a few years back. It was normal per patient.  She is requesting mammogram.    Patient would also like to get her flu vaccination today.    Ped size 4.0 trach Does she use inner canula if so state disposable inner cannula. Using new one every day. Passey greer separate.     Objective     Vital Signs:   /68 (BP Location: Left arm, Patient Position: Sitting, Cuff Size: Adult)   Pulse 73   Temp 98.3 °F (36.8 °C) " "(Oral)   Ht 149.9 cm (59.02\")   Wt 55.8 kg (123 lb)   SpO2 96%   BMI 24.83 kg/m²           Physical Exam  Vitals reviewed.   Constitutional:       Appearance: She is well-developed.      Comments: Wearing a face mask     HENT:      Head: Normocephalic and atraumatic.      Nose: Nose normal.      Mouth/Throat:      Mouth: Mucous membranes are moist.      Pharynx: Oropharynx is clear.   Eyes:      Conjunctiva/sclera: Conjunctivae normal.   Neck:      Trachea: Tracheostomy present.     Cardiovascular:      Rate and Rhythm: Normal rate and regular rhythm.      Pulses: Normal pulses.      Heart sounds: Normal heart sounds.   Pulmonary:      Effort: Pulmonary effort is normal.      Breath sounds: Normal breath sounds.   Abdominal:      General: Bowel sounds are normal.      Palpations: Abdomen is soft.   Musculoskeletal:         General: Normal range of motion.      Cervical back: Normal range of motion and neck supple. No tenderness.   Skin:     General: Skin is warm and dry.      Findings: No rash.   Neurological:      Mental Status: She is alert and oriented to person, place, and time.   Psychiatric:         Behavior: Behavior normal.                Result Review :                                   Assessment and Plan      Diagnoses and all orders for this visit:    1. Flank pain (Primary)  -     POC Urinalysis Dipstick, Automated    2. Acute cystitis with hematuria  -     nitrofurantoin, macrocrystal-monohydrate, (Macrobid) 100 MG capsule; Take 1 capsule by mouth 2 (Two) Times a Day.  Dispense: 10 capsule; Refill: 1    3. Need for influenza vaccination  -     FluLaval/Fluarix/Fluzone >6 Months    4. Infantile idiopathic scoliosis, unspecified spinal region  -     Ambulatory Referral to Neurosurgery    5. Encounter for screening mammogram for breast cancer  -     Mammo Screening Bilateral With CAD; Future    6. Tracheostomy care (HCC)    7. Hypothyroidism, unspecified type  Assessment & Plan:  Thyroid level " improved from previous check.  Patient has been taking her medication regularly.  We will check again at next follow-up.      Other orders  -     busPIRone (BUSPAR) 10 MG tablet; Take 1 tablet by mouth 3 (Three) Times a Day.  Dispense: 90 tablet; Refill: 2          Follow Up       No follow-ups on file.      Patient was given instructions and counseling regarding her condition or for health maintenance advice. Please see specific information pulled into the AVS if appropriate.     Ethel Blackman, APRN10/21/202217:29 EDT  This note has been electronically signed

## 2022-10-21 NOTE — ASSESSMENT & PLAN NOTE
Thyroid level improved from previous check.  Patient has been taking her medication regularly.  We will check again at next follow-up.

## 2022-10-24 ENCOUNTER — TELEPHONE (OUTPATIENT)
Dept: FAMILY MEDICINE CLINIC | Facility: CLINIC | Age: 52
End: 2022-10-24

## 2022-10-24 NOTE — TELEPHONE ENCOUNTER
Pt states that you discussed ordering a breast u/s to be done when she has her screening mammogram due to family hx.  She states they have contacted her to set the appt for mammogram but no u/s was ordered.

## 2022-10-25 DIAGNOSIS — Z80.3 FAMILY HISTORY OF BREAST CANCER: Primary | ICD-10-CM

## 2022-10-25 NOTE — TELEPHONE ENCOUNTER
I think there was some confusion.  I was talking about ultrasounds but informed her those were for lumps or lesions or positive mammograms.  I cannot order an ultrasound without any underlying issue or concern with the breast.  Family history of breast cancer does not qualify for that.  We will proceed with mammogram and if there are any abnormal findings we may need to order the ultrasound.

## 2022-10-31 RX ORDER — LEVOTHYROXINE SODIUM 88 UG/1
88 TABLET ORAL DAILY
Qty: 30 TABLET | Refills: 0 | Status: SHIPPED | OUTPATIENT
Start: 2022-10-31 | End: 2023-02-09

## 2022-11-03 ENCOUNTER — TRANSCRIBE ORDERS (OUTPATIENT)
Dept: ADMINISTRATIVE | Facility: HOSPITAL | Age: 52
End: 2022-11-03

## 2022-11-03 DIAGNOSIS — Z12.31 ENCOUNTER FOR SCREENING MAMMOGRAM FOR BREAST CANCER: Primary | ICD-10-CM

## 2022-11-03 NOTE — PROGRESS NOTES
Neurosurgical Consultation      Lizzie Campos is a 52 y.o. female is being seen for consultation today at the request of YEIMY Claire.     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  She has had abdominal hernia repair.  She has known scoliosis since childhood.  No surgical intervention was recommended or pursued.  She has recently [place] to this [place] and is interested in establishing care for her scoliosis.  She does have chronic headaches as well as some neck pain.  With excessive activity she describes back pain as well as numbness and tingling into her fingers and toes.  She is never taken any significant pain medicine.  She last completed physical therapy in 2019.  She has worked with chiropractors in the past but not recently.    Past Medical History:   Diagnosis Date   • Anxiety    • Arthritis    • Depression    • Encounter for hepatitis C screening test for low risk patient 2022   • Headache    • Hx of hepatitis C     NEGATIVE SINCE  WITH TREATMENT    • Hypothyroidism    • Low back pain         Past Surgical History:   Procedure Laterality Date   •  SECTION     • HERNIA REPAIR     • HYSTERECTOMY          Current Outpatient Medications on File Prior to Visit   Medication Sig Dispense Refill   • busPIRone (BUSPAR) 10 MG tablet Take 1 tablet by mouth 3 (Three) Times a Day. 90 tablet 2   • dicyclomine (BENTYL) 10 MG capsule Take 10 mg by mouth 4 (Four) Times a Day Before Meals & at Bedtime.     • levothyroxine (Euthyrox) 88 MCG tablet Take 1 tablet by mouth Daily. 30 tablet 0   • Mirabegron ER (MYRBETRIQ) 25 MG tablet sustained-release 24 hour 24 hr tablet Take 25 mg by mouth  "Daily.     • nitrofurantoin, macrocrystal-monohydrate, (Macrobid) 100 MG capsule Take 1 capsule by mouth 2 (Two) Times a Day. 10 capsule 1     No current facility-administered medications on file prior to visit.        Allergies   Allergen Reactions   • Penicillins Hives        Social History     Socioeconomic History   • Marital status: Single   Tobacco Use   • Smoking status: Never   • Smokeless tobacco: Never   Vaping Use   • Vaping Use: Former   • Start date: 2/1/2021   • Quit date: 4/1/2021   • Substances: THC   • Devices: Disposable   Substance and Sexual Activity   • Alcohol use: Not Currently     Comment: QUIT DRINKING IN 2013   • Drug use: Not Currently     Types: Marijuana     Comment: HAD MEDICAL CARD TO SMOKE  11/2/21   • Sexual activity: Not Currently          Review of Systems   Constitutional: Negative.    HENT: Negative.    Eyes: Positive for visual disturbance (dark mass behind left eye).   Cardiovascular: Negative.    Gastrointestinal: Positive for diarrhea.   Endocrine: Positive for cold intolerance.   Genitourinary: Negative.    Musculoskeletal: Negative.    Skin: Negative.    Allergic/Immunologic: Negative.    Neurological: Positive for headache.   Hematological: Bruises/bleeds easily.   Psychiatric/Behavioral: Positive for sleep disturbance and depressed mood. The patient is nervous/anxious.         Physical Examination:     Vitals:    11/04/22 1027   BP: 126/86   Pulse: 63   SpO2: 99%   Weight: 55.8 kg (123 lb)   Height: 149.9 cm (59.02\")        Physical Exam  Neurological:      Motor: Motor strength is normal.      Deep Tendon Reflexes:      Reflex Scores:       Patellar reflexes are 1+ on the right side and 1+ on the left side.       Achilles reflexes are 0 on the right side and 1+ on the left side.  Psychiatric:         Speech: Speech normal.          Neurological Exam  Mental Status  Awake, alert and oriented to person, place and time. Speech is normal. Language is fluent with no " aphasia.    Motor  Normal muscle bulk throughout. No fasciculations present. Strength is 5/5 throughout all four extremities.    Sensory  Light touch is normal in upper and lower extremities.     Reflexes                                            Right                      Left  Patellar                                1+                         1+  Achilles                                0                         1+    Right pathological reflexes: Robyn's absent.  Left pathological reflexes: Robyn's absent.    Coordination  Right: Finger-to-nose normal.Left: Finger-to-nose normal.     Negative straight leg raise bilaterally.    Result Review  The following data was reviewed by: Bob Carranza MD on 11/04/2022:    Data reviewed: Radiologic studies CT of the lumbar spine shows focal lumbar scoliosis.  With possible nonsegmentation across 3 vertebrae.  No significant scoliosis appreciated on thoracic CT scan.  Slight reversal of normal lumbar lordosis and cervical spine.      Diagnoses and all orders for this visit:    1. Idiopathic scoliosis in adult patient (Primary)  -     XR Spine Scoliosis 2 or 3 Views; Future  -     XR Spine Lumbar Complete With Flex & Ext; Future  -     XR spine cervical ap and lat w flex and ext; Future         Return in about 1 year (around 11/4/2023).            Bob Carranza MD

## 2022-11-04 ENCOUNTER — HOSPITAL ENCOUNTER (OUTPATIENT)
Dept: GENERAL RADIOLOGY | Facility: HOSPITAL | Age: 52
Discharge: HOME | End: 2022-11-04

## 2022-11-04 ENCOUNTER — PATIENT ROUNDING (BHMG ONLY) (OUTPATIENT)
Dept: NEUROSURGERY | Facility: CLINIC | Age: 52
End: 2022-11-04

## 2022-11-04 ENCOUNTER — OFFICE VISIT (OUTPATIENT)
Dept: NEUROSURGERY | Facility: CLINIC | Age: 52
End: 2022-11-04

## 2022-11-04 VITALS
BODY MASS INDEX: 24.8 KG/M2 | HEIGHT: 59 IN | WEIGHT: 123 LBS | DIASTOLIC BLOOD PRESSURE: 86 MMHG | SYSTOLIC BLOOD PRESSURE: 126 MMHG | HEART RATE: 63 BPM | OXYGEN SATURATION: 99 %

## 2022-11-04 DIAGNOSIS — M41.20 IDIOPATHIC SCOLIOSIS IN ADULT PATIENT: ICD-10-CM

## 2022-11-04 DIAGNOSIS — M41.20 IDIOPATHIC SCOLIOSIS IN ADULT PATIENT: Primary | ICD-10-CM

## 2022-11-04 PROCEDURE — 99204 OFFICE O/P NEW MOD 45 MIN: CPT | Performed by: NEUROLOGICAL SURGERY

## 2022-11-04 PROCEDURE — 72050 X-RAY EXAM NECK SPINE 4/5VWS: CPT

## 2022-11-04 PROCEDURE — 72082 X-RAY EXAM ENTIRE SPI 2/3 VW: CPT

## 2022-11-04 PROCEDURE — 72114 X-RAY EXAM L-S SPINE BENDING: CPT

## 2022-11-17 ENCOUNTER — APPOINTMENT (OUTPATIENT)
Dept: MAMMOGRAPHY | Facility: HOSPITAL | Age: 52
End: 2022-11-17

## 2022-12-02 ENCOUNTER — HOSPITAL ENCOUNTER (OUTPATIENT)
Dept: MAMMOGRAPHY | Facility: HOSPITAL | Age: 52
Discharge: HOME | End: 2022-12-02
Admitting: NURSE PRACTITIONER

## 2022-12-02 DIAGNOSIS — Z12.31 ENCOUNTER FOR SCREENING MAMMOGRAM FOR BREAST CANCER: ICD-10-CM

## 2022-12-02 PROCEDURE — 77067 SCR MAMMO BI INCL CAD: CPT

## 2022-12-02 PROCEDURE — 77063 BREAST TOMOSYNTHESIS BI: CPT

## 2022-12-15 ENCOUNTER — TELEPHONE (OUTPATIENT)
Dept: FAMILY MEDICINE CLINIC | Facility: CLINIC | Age: 52
End: 2022-12-15

## 2022-12-16 DIAGNOSIS — Z93.0 TRACHEOSTOMY IN PLACE: ICD-10-CM

## 2022-12-16 DIAGNOSIS — J39.8 TRACHEOMALACIA: Primary | ICD-10-CM

## 2022-12-16 NOTE — TELEPHONE ENCOUNTER
I think we called [facility] in [place] before. Can we call to see if she has been there. If so tell them I will fax over order for the above. Please let them know what details as I want to ensure they have it is stock.

## 2022-12-16 NOTE — PROGRESS NOTES
Please notify Lizzie that there is an 8 mm asymmetry in the inner upper left breast that needs additional evaluation.  Diagnostic mammo of left breast with ultrasound.  Where would patient like to have done

## 2022-12-19 DIAGNOSIS — Z80.3 FAMILY HISTORY OF BREAST CANCER: Primary | ICD-10-CM

## 2022-12-19 DIAGNOSIS — N64.9 BREAST LESION: ICD-10-CM

## 2022-12-27 ENCOUNTER — HOSPITAL ENCOUNTER (OUTPATIENT)
Dept: MAMMOGRAPHY | Facility: HOSPITAL | Age: 52
Discharge: HOME | End: 2022-12-27

## 2022-12-27 ENCOUNTER — HOSPITAL ENCOUNTER (OUTPATIENT)
Dept: ULTRASOUND IMAGING | Facility: HOSPITAL | Age: 52
Discharge: HOME | End: 2022-12-27

## 2022-12-27 DIAGNOSIS — N64.89 BREAST ASYMMETRY: ICD-10-CM

## 2022-12-27 PROCEDURE — 77065 DX MAMMO INCL CAD UNI: CPT

## 2022-12-27 PROCEDURE — 76642 ULTRASOUND BREAST LIMITED: CPT

## 2022-12-27 PROCEDURE — G0279 TOMOSYNTHESIS, MAMMO: HCPCS

## 2022-12-29 NOTE — PROGRESS NOTES
Please notify Lizzie that her mammogram diagnostic has returned.  Findings were benign.  Patient to follow-up annually with repeat mammogram.

## 2023-02-09 RX ORDER — LEVOTHYROXINE SODIUM 88 UG/1
TABLET ORAL
Qty: 30 TABLET | Refills: 0 | Status: SHIPPED | OUTPATIENT
Start: 2023-02-09 | End: 2023-02-10 | Stop reason: SDUPTHER

## 2023-02-10 RX ORDER — BUSPIRONE HYDROCHLORIDE 10 MG/1
10 TABLET ORAL 3 TIMES DAILY
Qty: 90 TABLET | Refills: 2 | Status: SHIPPED | OUTPATIENT
Start: 2023-02-10

## 2023-02-10 RX ORDER — LEVOTHYROXINE SODIUM 88 UG/1
88 TABLET ORAL DAILY
Qty: 30 TABLET | Refills: 0 | Status: SHIPPED | OUTPATIENT
Start: 2023-02-10

## 2023-02-16 ENCOUNTER — OFFICE VISIT (OUTPATIENT)
Dept: FAMILY MEDICINE CLINIC | Facility: CLINIC | Age: 53
End: 2023-02-16
Payer: MEDICARE

## 2023-02-16 VITALS
DIASTOLIC BLOOD PRESSURE: 72 MMHG | HEIGHT: 59 IN | WEIGHT: 117 LBS | SYSTOLIC BLOOD PRESSURE: 118 MMHG | BODY MASS INDEX: 23.59 KG/M2 | TEMPERATURE: 99 F | OXYGEN SATURATION: 99 % | HEART RATE: 84 BPM

## 2023-02-16 DIAGNOSIS — F41.9 ANXIETY: Primary | ICD-10-CM

## 2023-02-16 PROCEDURE — 99213 OFFICE O/P EST LOW 20 MIN: CPT | Performed by: NURSE PRACTITIONER

## 2023-02-16 NOTE — PROGRESS NOTES
"Chief Complaint  Anxiety    Subjective          Lizzie Campos presents to [facility]      History of Present Illness    Lizzie is a 52 year old female patient who presents today for anxiety.     She reports her anxiety has been worse since her mother's health has declined.  She indicates her mother is in a behavioral unit.  She has been on BuSpar for quite some time now. She only takes half tablet once daily of the BuSpar. She is asking if she can take a full tablet once daily. I advised her that she is able to do so as she was prescribed 10 mg 3 times daily.  Patient verbalized understanding.    She is getting low on her trach supplies. She is needing those sent in as she is about to run out.     Not disposable. Cleans daily.    Esequiel PEF pediatric 4mm   6.0 OD Cuffless Clear speaking cap.     She needs mail order.     Has upcoming appt 3/3/23 for chronic medical conditions.           Objective     Vital Signs:   /72 (BP Location: Left arm, Patient Position: Sitting, Cuff Size: Adult)   Pulse 84   Temp 99 °F (37.2 °C) (Oral)   Ht 149.9 cm (59.02\")   Wt 53.1 kg (117 lb)   SpO2 99%   BMI 23.62 kg/m²           Physical Exam  Vitals reviewed.   Constitutional:       Appearance: She is well-developed.      Comments: Wearing a face mask     HENT:      Head: Normocephalic and atraumatic.      Nose: Nose normal.      Mouth/Throat:      Mouth: Mucous membranes are moist.      Pharynx: Oropharynx is clear.   Eyes:      Conjunctiva/sclera: Conjunctivae normal.   Neck:      Trachea: Tracheostomy present.     Cardiovascular:      Rate and Rhythm: Normal rate and regular rhythm.      Pulses: Normal pulses.      Heart sounds: Normal heart sounds.   Pulmonary:      Effort: Pulmonary effort is normal.      Breath sounds: Normal breath sounds.   Abdominal:      General: Bowel sounds are normal.      Palpations: Abdomen is soft.   Musculoskeletal:         General: Normal range of " motion.      Cervical back: Normal range of motion and neck supple. No tenderness.   Skin:     General: Skin is warm and dry.      Findings: No rash.   Neurological:      Mental Status: She is alert and oriented to person, place, and time.   Psychiatric:         Attention and Perception: Attention and perception normal.         Mood and Affect: Affect normal. Mood is anxious.         Speech: Speech normal.         Behavior: Behavior normal. Behavior is cooperative.         Thought Content: Thought content normal. Thought content is not paranoid or delusional. Thought content does not include homicidal or suicidal ideation. Thought content does not include homicidal or suicidal plan.                Result Review :                                   Assessment and Plan      Diagnoses and all orders for this visit:    1. Anxiety (Primary)      Reinstructed patient on BuSpar and dosing.  We will see her at her follow-up on 3/3/2023 for chronic medical conditions and draw labs at that time.            Follow Up       No follow-ups on file.      Patient was given instructions and counseling regarding her condition or for health maintenance advice. Please see specific information pulled into the AVS if appropriate.     Ethel Blackman, APRN2/16/202312:02 EST  This note has been electronically signed

## 2023-03-06 ENCOUNTER — OFFICE VISIT (OUTPATIENT)
Dept: FAMILY MEDICINE CLINIC | Facility: CLINIC | Age: 53
End: 2023-03-06
Payer: MEDICARE

## 2023-03-06 VITALS
HEIGHT: 59 IN | OXYGEN SATURATION: 100 % | DIASTOLIC BLOOD PRESSURE: 72 MMHG | HEART RATE: 53 BPM | TEMPERATURE: 98.5 F | WEIGHT: 118.2 LBS | BODY MASS INDEX: 23.83 KG/M2 | SYSTOLIC BLOOD PRESSURE: 124 MMHG

## 2023-03-06 DIAGNOSIS — E03.9 HYPOTHYROIDISM, UNSPECIFIED TYPE: ICD-10-CM

## 2023-03-06 DIAGNOSIS — Z00.00 ENCOUNTER FOR SUBSEQUENT ANNUAL WELLNESS VISIT (AWV) IN MEDICARE PATIENT: Primary | ICD-10-CM

## 2023-03-06 PROCEDURE — G0439 PPPS, SUBSEQ VISIT: HCPCS | Performed by: NURSE PRACTITIONER

## 2023-03-06 PROCEDURE — 1170F FXNL STATUS ASSESSED: CPT | Performed by: NURSE PRACTITIONER

## 2023-03-06 PROCEDURE — 1159F MED LIST DOCD IN RCRD: CPT | Performed by: NURSE PRACTITIONER

## 2023-03-06 NOTE — PROGRESS NOTES
The ABCs of the Annual Wellness Visit  Subsequent Medicare Wellness Visit    Subjective    Lizzie Campos is a 52 y.o. female who presents for a Subsequent Medicare Wellness Visit.    The following portions of the patient's history were reviewed and   updated as appropriate: allergies, current medications, past family history, past medical history, past social history, past surgical history and problem list.    Compared to one year ago, the patient feels her physical   health is the same.    Compared to one year ago, the patient feels her mental   health is the same. Goes back and forth between good and bad days. She is dealing with her mothers mental health issues.     Recent Hospitalizations:  She was not admitted to the hospital during the last year.       Current Medical Providers:  Patient Care Team:  Ethel Blackman APRN as PCP - General (Nurse Practitioner)    Outpatient Medications Prior to Visit   Medication Sig Dispense Refill   • busPIRone (BUSPAR) 10 MG tablet Take 1 tablet by mouth 3 (Three) Times a Day. 90 tablet 2   • dicyclomine (BENTYL) 10 MG capsule Take 1 capsule by mouth 4 (Four) Times a Day Before Meals & at Bedtime.     • levothyroxine (SYNTHROID, LEVOTHROID) 88 MCG tablet Take 1 tablet by mouth Daily. 30 tablet 0   • Mirabegron ER (MYRBETRIQ) 25 MG tablet sustained-release 24 hour 24 hr tablet Take 1 tablet by mouth Daily.       No facility-administered medications prior to visit.       No opioid medication identified on active medication list. I have reviewed chart for other potential  high risk medication/s and harmful drug interactions in the elderly.          Aspirin is not on active medication list.  Aspirin use is not indicated based on review of current medical condition/s. Risk of harm outweighs potential benefits.  .    Patient Active Problem List   Diagnosis   • Infantile idiopathic scoliosis of thoracolumbar region   • Anxiety   • Establishing care with new doctor,  "encounter for   • Vitamin B 12 deficiency   • Irritable bowel syndrome with diarrhea   • Hypothyroidism   • Encounter for annual wellness visit (AWV) in Medicare patient   • Tracheostomy care (HCC)   • Infantile idiopathic scoliosis   • Chronic back pain   • Pain of toe of left foot   • Pain in left leg   • Contusion of multiple sites of left leg   • Acute cystitis with hematuria   • Need for influenza vaccination   • Flank pain   • Encounter for screening mammogram for breast cancer   • Breast lesion   • Family history of breast cancer     Advance Care Planning  Advance Directive is on file.  ACP discussion was held with the patient during this visit. Patient has an advance directive in EMR which is still valid.      Objective    Vitals:    03/06/23 1123   BP: 124/72   BP Location: Left arm   Patient Position: Sitting   Cuff Size: Adult   Pulse: 53   Temp: 98.5 °F (36.9 °C)   TempSrc: Oral   SpO2: 100%   Weight: 53.6 kg (118 lb 3.2 oz)   Height: 149.9 cm (59.02\")     Estimated body mass index is 23.86 kg/m² as calculated from the following:    Height as of this encounter: 149.9 cm (59.02\").    Weight as of this encounter: 53.6 kg (118 lb 3.2 oz).    BMI is within normal parameters. No other follow-up for BMI required.      Does the patient have evidence of cognitive impairment? No          HEALTH RISK ASSESSMENT    Smoking Status:  Social History     Tobacco Use   Smoking Status Never   Smokeless Tobacco Never     Alcohol Consumption:  Social History     Substance and Sexual Activity   Alcohol Use Not Currently    Comment: QUIT DRINKING IN 2013     Fall Risk Screen:    WILMAADI Fall Risk Assessment was completed, and patient is at LOW risk for falls.Assessment completed on:3/6/2023    Depression Screening:  PHQ-2/PHQ-9 Depression Screening 3/6/2023   Little Interest or Pleasure in Doing Things 0-->not at all   Feeling Down, Depressed or Hopeless 0-->not at all   Trouble Falling or Staying Asleep, or Sleeping Too Much " -   Feeling Tired or Having Little Energy -   Poor Appetite or Overeating -   Feeling Bad about Yourself - or that You are a Failure or Have Let Yourself or Your Family Down -   Trouble Concentrating on Things, Such as Reading the Newspaper or Watching Television -   Moving or Speaking So Slowly that Other People Could Have Noticed? Or the Opposite - Being So Fidgety -   Thoughts that You Would be Better Off Dead or of Hurting Yourself in Some Way -   PHQ-9: Brief Depression Severity Measure Score 0   If You Checked Off Any Problems, How Difficult Have These Problems Made It For You to Do Your Work, Take Care of Things at Home, or Get Along with Other People? -       Health Habits and Functional and Cognitive Screening:  Functional & Cognitive Status 2/28/2022   Do you have difficulty preparing food and eating? No   Do you have difficulty bathing yourself, getting dressed or grooming yourself? No   Do you have difficulty using the toilet? No   Do you have difficulty moving around from place to place? No   Do you have trouble with steps or getting out of a bed or a chair? Yes   Current Diet Limited Junk Food   Dental Exam Not up to date   Eye Exam Not up to date   Exercise (times per week) 0 times per week   Current Exercises Include No Regular Exercise   Do you need help using the phone?  No   Are you deaf or do you have serious difficulty hearing?  No   Do you need help with transportation? No   Do you need help shopping? No   Do you need help preparing meals?  No   Do you need help with housework?  No   Do you need help with laundry? Yes   Do you need help taking your medications? No   Do you need help managing money? No   Do you ever drive or ride in a car without wearing a seat belt? No   Have you felt unusual stress, anger or loneliness in the last month? Yes   Who do you live with? Other   If you need help, do you have trouble finding someone available to you? Yes   Have you been bothered in the last four  weeks by sexual problems? No   Do you have difficulty concentrating, remembering or making decisions? No       Age-appropriate Screening Schedule:  Refer to the list below for future screening recommendations based on patient's age, sex and/or medical conditions. Orders for these recommended tests are listed in the plan section. The patient has been provided with a written plan.    Health Maintenance   Topic Date Due   • ZOSTER VACCINE (1 of 2) Never done   • COVID-19 Vaccine (5 - Booster for Moderna series) 03/21/2022   • ANNUAL WELLNESS VISIT  03/06/2024   • MAMMOGRAM  12/27/2024   • TDAP/TD VACCINES (2 - Td or Tdap) 12/29/2030   • COLORECTAL CANCER SCREENING  02/09/2031   • HEPATITIS C SCREENING  Completed   • INFLUENZA VACCINE  Completed   • Pneumococcal Vaccine 0-64  Aged Out                CMS Preventative Services Quick Reference  Risk Factors Identified During Encounter  None Identified  The above risks/problems have been discussed with the patient.  Pertinent information has been shared with the patient in the After Visit Summary.  An After Visit Summary and PPPS were made available to the patient.    Follow Up:   Next Medicare Wellness visit to be scheduled in 1 year.       Additional E&M Note during same encounter follows:  Patient has multiple medical problems which are significant and separately identifiable that require additional work above and beyond the Medicare Wellness Visit.      Chief Complaint  Hypothyroidism, Medicare Wellness-subsequent, and Anxiety    Subjective        Hypothyroidism  This is a chronic problem. The current episode started more than 1 year ago. The problem has been unchanged. Associated symptoms comments: Asymptomatic  .     Lizzie Campos is also being seen today for hypothyroidism. She has been stable for a while. Last abnormal was 7/21/22. Last recheck was 8/18/22 normal TSH was 2.380. She is without palpitatons, hair loss, heat or cold intolerances.        Objective  "  Vital Signs:  /72 (BP Location: Left arm, Patient Position: Sitting, Cuff Size: Adult)   Pulse 53   Temp 98.5 °F (36.9 °C) (Oral)   Ht 149.9 cm (59.02\")   Wt 53.6 kg (118 lb 3.2 oz)   SpO2 100%   BMI 23.86 kg/m²     Physical Exam  Vitals reviewed.   Constitutional:       Appearance: She is well-developed.      Comments: Wearing a face mask     HENT:      Head: Normocephalic and atraumatic.   Eyes:      Conjunctiva/sclera: Conjunctivae normal.   Cardiovascular:      Rate and Rhythm: Normal rate.   Pulmonary:      Effort: Pulmonary effort is normal.   Musculoskeletal:         General: Normal range of motion.      Cervical back: Normal range of motion.   Skin:     General: Skin is warm and dry.      Findings: No rash.   Neurological:      Mental Status: She is alert and oriented to person, place, and time.   Psychiatric:         Behavior: Behavior normal.                         Assessment and Plan   Diagnoses and all orders for this visit:    1. Encounter for subsequent annual wellness visit (AWV) in Medicare patient (Primary)    2. Hypothyroidism, unspecified type  -     Cancel: TSH  -     Cancel: CBC (No Diff)  -     Cancel: Comprehensive metabolic panel  -     Cancel: Comprehensive metabolic panel      Recommended labs today however patient declined.  She will get labs at her 3-month follow-up for thyroid.           Follow Up   Return in about 3 months (around 6/6/2023) for with KRISHNA Wilde.  Patient was given instructions and counseling regarding her condition or for health maintenance advice. Please see specific information pulled into the AVS if appropriate.         "

## 2023-08-11 LAB — TSH SERPL DL<=0.005 MIU/L-ACNC: 7.65 UIU/ML (ref 0.45–4.5)

## 2023-08-11 NOTE — PROGRESS NOTES
Please inform Patricia her TSH is 7.650.  This is better than what it has been a month ago.  Lets continue with her being consistent in taking her medication we will recheck again sometime in October or November when she can schedule an appointment.

## 2023-10-23 DIAGNOSIS — E03.9 HYPOTHYROIDISM, UNSPECIFIED TYPE: Primary | ICD-10-CM

## 2023-10-23 RX ORDER — LEVOTHYROXINE SODIUM 0.1 MG/1
100 TABLET ORAL DAILY
Qty: 90 TABLET | Refills: 0 | Status: SHIPPED | OUTPATIENT
Start: 2023-10-23

## 2023-10-26 NOTE — PROGRESS NOTES
Neurosurgical Consultation      Lizzie Campos is a 53 y.o. female is here today for follow-up for back pain. Today patient reports left sided low back pain.    Chief Complaint   Patient presents with    Back Pain     Follow up         Previous treatment:    HPI: This is a 53-year-old woman who I last evaluated on 2022.  She has a current BMI of 28 which is up from 25 at my last evaluation.  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  She has a history of an abdominal hernia repair.  She has had known scoliosis since childhood.  She is never had any surgical intervention.  At my last evaluation she was establishing care.  She desired a 1 year follow-up.  She returns today for 1 year follow-up.  She does comment that she has gained weight and had less activity since her mother was moved to a facility.  She does have occasional left-sided more than right-sided back pain as well as left-sided more than right-sided leg pain.  This is not life altering.    Past Medical History:   Diagnosis Date    Anxiety     Arthritis     Depression     Encounter for hepatitis C screening test for low risk patient 2022    Headache     Hx of hepatitis C     NEGATIVE SINCE  WITH TREATMENT     Hypothyroidism     Low back pain         Past Surgical History:   Procedure Laterality Date     SECTION      HERNIA REPAIR      HYSTERECTOMY          Current Outpatient Medications on File Prior to Visit   Medication Sig Dispense Refill    busPIRone (BUSPAR) 10 MG tablet Take 1 tablet by mouth 3 (Three) Times a Day. 90 tablet 2    dicyclomine (BENTYL) 10 MG capsule Take 1 capsule by mouth 4 (Four) Times a Day Before Meals & at Bedtime.      levothyroxine (Synthroid) 100 MCG tablet Take 1 tablet by mouth Daily. 90 tablet 0    Mirabegron ER (MYRBETRIQ) 25 MG tablet  "sustained-release 24 hour 24 hr tablet Take 1 tablet by mouth Daily.       No current facility-administered medications on file prior to visit.        Allergies   Allergen Reactions    Penicillins Hives        Social History     Socioeconomic History    Marital status: Single   Tobacco Use    Smoking status: Never    Smokeless tobacco: Never   Vaping Use    Vaping Use: Former    Start date: 2/1/2021    Quit date: 4/1/2021    Substances: THC    Devices: Disposable   Substance and Sexual Activity    Alcohol use: Not Currently     Comment: QUIT DRINKING IN 2013    Drug use: Not Currently     Types: Marijuana     Comment: HAD MEDICAL CARD TO SMOKE  11/2/21    Sexual activity: Not Currently          Review of Systems   Constitutional:  Positive for activity change.   HENT: Negative.     Eyes: Negative.    Respiratory: Negative.     Cardiovascular: Negative.    Gastrointestinal: Negative.    Endocrine: Negative.    Genitourinary: Negative.    Musculoskeletal:  Positive for arthralgias, back pain and myalgias.   Skin: Negative.    Allergic/Immunologic: Negative.    Neurological:  Positive for weakness (left leg) and numbness (feet /hands).   Psychiatric/Behavioral:  Positive for sleep disturbance.         Physical Examination:     Vitals:    11/03/23 1245   BP: 125/81   Pulse: 81   Weight: 62 kg (136 lb 9.6 oz)   Height: 149.7 cm (58.93\")   PainSc:   9        Physical Exam     Neurological Exam   Neurological examination appears stable compared to my last evaluation.  No new red flag signs.    Result Review  The following data was reviewed by: Bob Carranza MD on 11/03/2023:    Data reviewed : Radiologic studies scoliosis x-rays show a lumbar levoscoliosis approximately 50 degrees without significant compensatory thoracic scoliosis.  She does have some cervical kyphotic deformity.  Her spinal anatomy is relatively stable compared to a year ago.      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  She has known scoliosis.  She is never had any intervention.  At this juncture I do not know that it is necessary to follow with serial imaging.  She can return to see me in the future if she has significant worsening of her back pain or indications of neurologic symptoms into her legs or changes in bowel or bladder function.  She will call me with any questions or concerns.  I did discuss with her completion of physical therapy as I believe this will improve her functional status.  She desires to proceed with this.    Diagnoses and all orders for this visit:    1. Idiopathic scoliosis in adult patient (Primary)  -     Ambulatory Referral to Physical Therapy Evaluate and treat         Return if symptoms worsen or fail to improve.            Bob Carranza MD

## 2023-10-27 ENCOUNTER — TELEPHONE (OUTPATIENT)
Dept: NEUROSURGERY | Facility: CLINIC | Age: 53
End: 2023-10-27
Payer: MEDICARE

## 2023-10-27 DIAGNOSIS — M41.20 IDIOPATHIC SCOLIOSIS IN ADULT PATIENT: Primary | ICD-10-CM

## 2023-10-30 ENCOUNTER — OFFICE VISIT (OUTPATIENT)
Dept: FAMILY MEDICINE CLINIC | Facility: CLINIC | Age: 53
End: 2023-10-30
Payer: MEDICARE

## 2023-10-30 VITALS
SYSTOLIC BLOOD PRESSURE: 133 MMHG | TEMPERATURE: 96.6 F | DIASTOLIC BLOOD PRESSURE: 91 MMHG | WEIGHT: 136 LBS | OXYGEN SATURATION: 99 % | HEIGHT: 59 IN | HEART RATE: 85 BPM | BODY MASS INDEX: 27.42 KG/M2

## 2023-10-30 DIAGNOSIS — R31.9 HEMATURIA, UNSPECIFIED TYPE: ICD-10-CM

## 2023-10-30 DIAGNOSIS — Z90.710 HISTORY OF HYSTERECTOMY: ICD-10-CM

## 2023-10-30 DIAGNOSIS — E03.9 HYPOTHYROIDISM, UNSPECIFIED TYPE: ICD-10-CM

## 2023-10-30 DIAGNOSIS — R10.9 LEFT FLANK PAIN: ICD-10-CM

## 2023-10-30 DIAGNOSIS — Z80.3 FAMILY HISTORY OF BREAST CANCER: ICD-10-CM

## 2023-10-30 DIAGNOSIS — N89.8 VAGINAL DRYNESS: ICD-10-CM

## 2023-10-30 DIAGNOSIS — Z12.4 PAP SMEAR FOR CERVICAL CANCER SCREENING: Primary | ICD-10-CM

## 2023-10-30 LAB
BILIRUB BLD-MCNC: NEGATIVE MG/DL
CLARITY, POC: ABNORMAL
COLOR UR: ABNORMAL
EXPIRATION DATE: ABNORMAL
GLUCOSE UR STRIP-MCNC: NEGATIVE MG/DL
KETONES UR QL: NEGATIVE
LEUKOCYTE EST, POC: NEGATIVE
Lab: ABNORMAL
NITRITE UR-MCNC: NEGATIVE MG/ML
PH UR: 5.5 [PH] (ref 5–8)
PROT UR STRIP-MCNC: ABNORMAL MG/DL
RBC # UR STRIP: ABNORMAL /UL
SP GR UR: 1.03 (ref 1–1.03)
UROBILINOGEN UR QL: ABNORMAL

## 2023-10-30 NOTE — TELEPHONE ENCOUNTER
Called patient and let her know that Dr. Carranza put in an order for Scoliosis imaging and she can complete that the day of her appointment. Patient verbalized understanding.   
Caller: Lizzie Campos    Relationship to patient: Self    Best call back number: [phone]    Patient is needing: PATIENT CALLED, PATIENT IS NEEDING UPDATED ORDERS FOR XRS IN ORDER TO COMPLETE THEM BEFORE HER APPT. PATIENT WOULD LIKE TO KNOW IF SHE NEEDS XR BEFORE HER APPT. PLEASE REVIEW. PLEASE CALL PATIENT TO ADVISE.    THANK YOU     
Yes

## 2023-10-30 NOTE — PROGRESS NOTES
Chief Complaint  No chief complaint on file.        Lizzie Campos presents to [facility]        Subjective      53-year-old female patient presents today to follow-up on hypothyroidism.     Patient sent MyChart stating she is gaining weight.  Reports her weight is up around 135 pounds.  March of this year she was at 118 pounds.  Was originally 88 mcg of levothyroxine.  Increased to 100 mcg a week ago due to  TSH 10/20/2023 13.600.                      Objective         Vital Signs:     There were no vitals taken for this visit.      Physical Exam           History of Present Illness      Patient Active Problem List   Diagnosis    Infantile idiopathic scoliosis of thoracolumbar region    Anxiety    Establishing care with new doctor, encounter for    Vitamin B 12 deficiency    Irritable bowel syndrome with diarrhea    Hypothyroidism    Encounter for annual wellness visit (AWV) in Medicare patient    Tracheostomy care    Infantile idiopathic scoliosis    Chronic back pain    Pain of toe of left foot    Pain in left leg    Contusion of multiple sites of left leg    Acute cystitis with hematuria    Need for influenza vaccination    Flank pain    Encounter for screening mammogram for breast cancer    Breast lesion    Family history of breast cancer         Past Medical History:   Diagnosis Date    Anxiety     Arthritis     Depression     Encounter for hepatitis C screening test for low risk patient 02/28/2022    Headache     Hx of hepatitis C     NEGATIVE SINCE 2005 WITH TREATMENT     Hypothyroidism     Low back pain           Family History   Problem Relation Age of Onset    Breast cancer Mother     Cancer Mother     Thyroid disease Mother     Asthma Mother     Arthritis Mother     Heart disease Father     Hypertension Father     Diabetes Father     Cancer Father     Alcohol abuse Brother     Drug abuse Brother     Breast cancer Paternal Aunt           Past Surgical History:    Procedure Laterality Date     SECTION      HERNIA REPAIR      HYSTERECTOMY            Social History     Socioeconomic History    Marital status: Single   Tobacco Use    Smoking status: Never    Smokeless tobacco: Never   Vaping Use    Vaping Use: Former    Start date: 2021    Quit date: 2021    Substances: THC    Devices: Disposable   Substance and Sexual Activity    Alcohol use: Not Currently     Comment: QUIT DRINKING IN     Drug use: Not Currently     Types: Marijuana     Comment: HAD MEDICAL CARD TO SMOKE  21    Sexual activity: Not Currently                    Result Review :                                  BMI is within normal parameters. No other follow-up for BMI required.               Assessment and Plan      Diagnoses and all orders for this visit:    1. Hypothyroidism, unspecified type (Primary)      We will recheck thyroid panel in 6 weeks.            Follow Up       No follow-ups on file.      Patient was given instructions and counseling regarding her condition or for health maintenance advice. Please see specific information pulled into the AVS if appropriate.     Ethel Blackman, 307:58 EDT  This note has been electronically signed

## 2023-10-30 NOTE — PROGRESS NOTES
"Subjective     Chief Complaint   Patient presents with    Gynecologic Exam       Lizzie Campos is a 53 y.o. female who presents for an annual exam. The patient has no complaints today. The patient is not sexually active. GYN screening history: last pap: was normal. The patient wears seatbelts: yes. The patient participates in regular exercise: no. Has the patient ever been transfused or tattooed?: yes. The patient reports that there is not domestic violence in her life.     Has been with gross hematuria for several days with left flank pain. Has not had kidney stone in past.     Additionally, presents today to follow-up on hypothyroidism. Patient sent MyChart stating she is gaining weight.  Reports her weight is up around 135 pounds.  March of this year she was at 118 pounds.  Was originally 88 mcg of levothyroxine.  Increased to 100 mcg a week ago due to TSH 10/20/2023 13.600.      Patient with history of hysterectomy back in 1999.  She is with some vaginal dryness.  She would like to have a hormonal panel checked when she gets her TSH drawn in December.        Menstrual History:  OB History    No obstetric history on file.        No LMP recorded (lmp unknown). Patient has had a hysterectomy.         The following portions of the patient's history were reviewed and updated as appropriate:vital signs, allergies, current medications, past medical history, past social history, past surgical history, and problem list    Review of Systems   Pertinent items are noted in HPI.     Objective     /91 (BP Location: Left arm, Patient Position: Sitting, Cuff Size: Adult)   Pulse 85   Temp 96.6 °F (35.9 °C) (Infrared)   Ht 149.7 cm (58.93\")   Wt 61.7 kg (136 lb)   LMP  (LMP Unknown)   SpO2 99%   BMI 27.53 kg/m²       Physical Exam    General:   alert, comfortable, and cooperative   Heart: Not performed today   Lungs: Not performed today.   Breast: normal appearance, no masses or tenderness   Abdomen: {soft, " non-tender. Bowel sounds normal. No masses,  no organomegaly   Vulva: normal   Vagina: Dryness noted during exam   Cervix: absent   Uterus: Not performed today   Adnexa: Not performed today   Rectal: normal rectal, no masses       Lab Review   Labs: No data reviewed     Imaging   No data reviewed    Assessment & Plan     ASSESSMENT  Healthy female exam.    1. Pap smear for cervical cancer screening    2. Left flank pain    3. Hematuria, unspecified type    4. Hypothyroidism, unspecified type         PLAN    Specimen collected for Aptima screening.  Will notify patient when results return.  Has history of underlying hysterectomy.  We will draw female panel labs anytime after December 5 when patient has her thyroid panel checked    Hematuria present on UA today.  No other abnormalities.  Possible patient has an underlying kidney stone.  We will go ahead and obtain KUB and send urine for culture to ensure no growth.  No treatment antibiotic today.    Regarding hypothyroidism patient is on 100 mcg of Synthroid daily.  Reassuring labs 10 days ago TSH was 13.6.  We will wait a few weeks before we check labs again.    The patient was counseled regarding nutrition, physical activity, healthy weight, injury prevention, misuse of tobacco, alcohol and illicit drugs, sexual behavior and STI's, contraception, dental health, mental health, immunizations, and screenings.    Orders Placed This Encounter   Procedures    XR Abdomen KUB (In Office)    POC Urinalysis Dipstick, Automated     Medications prescribed this encounter:    No orders of the defined types were placed in this encounter.      Ethel Blackman, APRN  10/30/2023

## 2023-10-31 ENCOUNTER — TELEPHONE (OUTPATIENT)
Dept: FAMILY MEDICINE CLINIC | Facility: CLINIC | Age: 53
End: 2023-10-31
Payer: MEDICARE

## 2023-10-31 DIAGNOSIS — N20.0 KIDNEY STONE: Primary | ICD-10-CM

## 2023-10-31 NOTE — PROGRESS NOTES
Morning please advise patient she is of kidney stone in the pelvis.  No stone in right kidney.  Due to location stone should pass on its own.  Continue drinking water.  If symptoms worsen advised to go to ER

## 2023-10-31 NOTE — TELEPHONE ENCOUNTER
Caller: Lizzie Campos    Relationship: Self    Best call back number: [phone]     Caller requesting test results:     What test was performed: XRAY     When was the test performed: 10/30/23    Where was the test performed: IN OFFICE    Additional notes: PATIENT CALLING STATING THAT SHE REVIEWED THE RESULTS ON [employer] SHE WOULD LIKE FOR SOMEONE TO CALL AND GO OVER THE RESULTS WITH HER

## 2023-11-02 LAB
BACTERIA UR CULT: ABNORMAL
CYTOLOGIST CVX/VAG CYTO: NORMAL
CYTOLOGY CVX/VAG DOC CYTO: NORMAL
CYTOLOGY CVX/VAG DOC THIN PREP: NORMAL
DX ICD CODE: NORMAL
HIV 1 & 2 AB SER-IMP: NORMAL
HPV GENOTYPE REFLEX: NORMAL
HPV I/H RISK 4 DNA CVX QL PROBE+SIG AMP: NEGATIVE
OTHER ANTIBIOTIC SUSC ISLT: ABNORMAL
OTHER STN SPEC: NORMAL
STAT OF ADQ CVX/VAG CYTO-IMP: NORMAL

## 2023-11-03 ENCOUNTER — HOSPITAL ENCOUNTER (OUTPATIENT)
Dept: GENERAL RADIOLOGY | Facility: HOSPITAL | Age: 53
Discharge: HOME | End: 2023-11-03
Admitting: NEUROLOGICAL SURGERY
Payer: MEDICARE

## 2023-11-03 ENCOUNTER — OFFICE VISIT (OUTPATIENT)
Dept: NEUROSURGERY | Facility: CLINIC | Age: 53
End: 2023-11-03
Payer: MEDICARE

## 2023-11-03 VITALS
SYSTOLIC BLOOD PRESSURE: 125 MMHG | WEIGHT: 136.6 LBS | DIASTOLIC BLOOD PRESSURE: 81 MMHG | HEART RATE: 81 BPM | HEIGHT: 59 IN | BODY MASS INDEX: 27.54 KG/M2

## 2023-11-03 DIAGNOSIS — M41.20 IDIOPATHIC SCOLIOSIS IN ADULT PATIENT: ICD-10-CM

## 2023-11-03 DIAGNOSIS — M41.20 IDIOPATHIC SCOLIOSIS IN ADULT PATIENT: Primary | ICD-10-CM

## 2023-11-03 DIAGNOSIS — N20.0 KIDNEY STONE: Primary | ICD-10-CM

## 2023-11-03 PROCEDURE — 72082 X-RAY EXAM ENTIRE SPI 2/3 VW: CPT

## 2023-11-03 RX ORDER — CIPROFLOXACIN 500 MG/1
500 TABLET, FILM COATED ORAL 2 TIMES DAILY
Qty: 14 TABLET | Refills: 0 | Status: SHIPPED | OUTPATIENT
Start: 2023-11-03

## 2023-11-03 NOTE — PROGRESS NOTES
Please let Lizzie know I am send in an antibiotic ciprofloxacin to take twice daily for 7 days as urine culture came back positive -prescription has been sent to [facility]

## 2023-11-07 DIAGNOSIS — Z80.8 FAMILY HISTORY OF SKIN CANCER: ICD-10-CM

## 2023-11-07 DIAGNOSIS — N20.0 KIDNEY STONE: Primary | ICD-10-CM

## 2023-11-07 DIAGNOSIS — R10.9 LEFT FLANK PAIN: ICD-10-CM

## 2023-11-07 DIAGNOSIS — Z43.0 ATTENTION TO TRACHEOSTOMY: ICD-10-CM

## 2023-12-06 LAB
DHEA-S SERPL-MCNC: 148 UG/DL (ref 41.2–243.7)
ESTRADIOL SERPL-MCNC: <5 PG/ML
FSH SERPL-ACNC: 59.6 MIU/ML
PROLACTIN SERPL-MCNC: 9.4 NG/ML (ref 4.8–23.3)
SHBG SERPL-SCNC: 33.4 NMOL/L (ref 17.3–125)
TESTOST SERPL-MCNC: 7 NG/DL (ref 4–50)

## 2023-12-07 DIAGNOSIS — Q43.3: ICD-10-CM

## 2023-12-07 DIAGNOSIS — K44.9 DIAPHRAGMATIC HERNIA WITHOUT OBSTRUCTION AND WITHOUT GANGRENE: Primary | ICD-10-CM

## 2023-12-07 DIAGNOSIS — K56.1 INTUSSUSCEPTION OF SMALL BOWEL: ICD-10-CM

## 2023-12-07 DIAGNOSIS — E03.9 HYPOTHYROIDISM, UNSPECIFIED TYPE: ICD-10-CM

## 2023-12-07 RX ORDER — LEVOTHYROXINE SODIUM 0.1 MG/1
100 TABLET ORAL DAILY
Qty: 90 TABLET | Refills: 3 | Status: SHIPPED | OUTPATIENT
Start: 2024-01-01

## 2024-02-21 ENCOUNTER — TELEPHONE (OUTPATIENT)
Dept: NEUROSURGERY | Facility: CLINIC | Age: 54
End: 2024-02-21
Payer: MEDICARE

## 2024-02-21 NOTE — TELEPHONE ENCOUNTER
Caller: PATIENT    Relationship to patient: SELF    Best call back number: [phone]    Chief complaint: NEW SYMPTOMS    Type of visit: FOLLOW UP    Requested date: ASAP     If rescheduling, when is the original appointment: NA     Additional notes:PATIENT CALLED AND STATES THAT SHE IS HAVING A NEW SYMPTOM-PT STATES THAT FOR THE LAST YEAR SHE HAS HAD ISSUES WITH HOLDING THINGS IN HER RIGHT HAND-PT STATES THAT HER HAND SHAKES -PT STATES THAT SHE IS UNABLE TO HOLD A CUP WITH LIQUID IN IT-PT STATES THAT SHE HAS TO HOLD EVERYTHING IN HER LEFT HAND PT STATES THAT SHE HAS A SMALL TREMOR WHEN SHE IS NOT HOLDING ANYTHING-THIS ONLY AFFECTS THE RIGHT HAND-PT STATES THAT SHE IS UNSURE IF THIS IS COMING FROM HER BACK-PATIENT ALSO ADVISED SHE DID NOT COMPLETE PHYSICAL THERAPY PER THE LAST OFFICE NOTE-DUE TO PATIENTS SYMPTOMS AND REQUESTING APPT. SENDING TO OFFICE FOR APPT. THANK YOU

## 2024-02-21 NOTE — TELEPHONE ENCOUNTER
Called the patient back, she has a tremor in her right hand that will cause her to drop things. She denies any chronic neck pain. She has some minor aches here and there in her neck. This has been going on for over a year now. She has just put it off.

## 2024-02-22 ENCOUNTER — TELEPHONE (OUTPATIENT)
Dept: FAMILY MEDICINE CLINIC | Facility: CLINIC | Age: 54
End: 2024-02-22
Payer: MEDICARE

## 2024-02-22 DIAGNOSIS — Z93.0 TRACHEOSTOMY IN PLACE: ICD-10-CM

## 2024-02-22 DIAGNOSIS — Z93.0 TRACHEOSTOMY DEPENDENT: ICD-10-CM

## 2024-02-22 DIAGNOSIS — R06.02 SOB (SHORTNESS OF BREATH): Primary | ICD-10-CM

## 2024-02-22 DIAGNOSIS — E03.9 HYPOTHYROIDISM, UNSPECIFIED TYPE: ICD-10-CM

## 2024-02-22 NOTE — TELEPHONE ENCOUNTER
Lizzie needs a referral down to [place] for a general surgeon that also does hernia surgery, and will need to be in ICU afterwards. She seen Dr Worley  his office told her that Dr Beckman does not do surgery for hernia's and Dr Weinstein does not want to touch it . And they recommended some one in [place].

## 2024-02-27 DIAGNOSIS — K44.9 DIAPHRAGMATIC HERNIA WITHOUT OBSTRUCTION AND WITHOUT GANGRENE: Primary | ICD-10-CM

## 2024-02-28 RX ORDER — TRAMADOL HYDROCHLORIDE 50 MG/1
TABLET ORAL
COMMUNITY

## 2024-02-28 RX ORDER — CEFDINIR 300 MG/1
1 CAPSULE ORAL EVERY 12 HOURS SCHEDULED
COMMUNITY
Start: 2024-02-07

## 2024-02-28 RX ORDER — HYDROCODONE BITARTRATE AND ACETAMINOPHEN 5; 325 MG/1; MG/1
TABLET ORAL
COMMUNITY

## 2024-02-28 NOTE — PROGRESS NOTES
Neurosurgical Consultation      Lizzie Campos is a 53 y.o. female is here today for follow-up for tremors in her right hand. Today patient reports bilateral hand tremors,right greater than left.    Chief Complaint   Patient presents with    Back Pain        Previous treatment:    HPI: This is a 53-year-old woman who I last evaluated on November 3, 2023.  She has a current BMI of 30 up from 28 at my last evaluation as well as 25 at the evaluation before.  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  She has a history of known scoliosis since childhood and continues to follow me for surveillance management of this.  She presents today for an unscheduled evaluation to evaluate her tremors.  She has had approximately 3 years of tremors.  She notices that it is worsening.  The tremor is worse in the right arm than the left arm.  She also has a tremor in her jaw.  She does not have any tremors in her legs.  She is never had any treatment for this.  This is not and action/intention tremor.  She does not have any cognitive decline associated with this.  She does not have any apathy.  She does not have any rigidity.    Past Medical History:   Diagnosis Date    Anxiety     Arthritis     Depression     Encounter for hepatitis C screening test for low risk patient 2022    Headache     Hx of hepatitis C     NEGATIVE SINCE  WITH TREATMENT     Hypothyroidism     Low back pain         Past Surgical History:   Procedure Laterality Date     SECTION      HERNIA REPAIR      HYSTERECTOMY          Current Outpatient Medications on File Prior to Visit   Medication Sig Dispense Refill    busPIRone (BUSPAR) 10 MG tablet Take 1 tablet by mouth 3 (Three) Times a Day. 90 tablet 2    levothyroxine (Synthroid) 100 MCG tablet Take 1 tablet by mouth Daily. 90 tablet 3    cefdinir  (OMNICEF) 300 MG capsule Take 1 capsule by mouth Every 12 (Twelve) Hours. (Patient not taking: Reported on 2/29/2024)      ciprofloxacin (Cipro) 500 MG tablet Take 1 tablet by mouth 2 (Two) Times a Day. (Patient not taking: Reported on 2/29/2024) 14 tablet 0    dicyclomine (BENTYL) 10 MG capsule Take 1 capsule by mouth 4 (Four) Times a Day Before Meals & at Bedtime. (Patient not taking: Reported on 2/29/2024)      HYDROcodone-acetaminophen (NORCO) 5-325 MG per tablet TAKE 1 TABLET BY MOUTH 4 TIMES DAILY AS NEEDED FOR PAIN (Patient not taking: Reported on 2/29/2024)      Mirabegron ER (MYRBETRIQ) 25 MG tablet sustained-release 24 hour 24 hr tablet Take 1 tablet by mouth Daily. (Patient not taking: Reported on 2/29/2024)      traMADol (ULTRAM) 50 MG tablet TAKE 1 TABLET BY MOUTH EVERY 6 HOURS FOR 7 DAYS (Patient not taking: Reported on 2/29/2024)       No current facility-administered medications on file prior to visit.        Allergies   Allergen Reactions    Penicillins Hives        Social History     Socioeconomic History    Marital status: Single   Tobacco Use    Smoking status: Never    Smokeless tobacco: Never   Vaping Use    Vaping Use: Former    Start date: 2/1/2021    Quit date: 4/1/2021    Substances: THC    Devices: Disposable   Substance and Sexual Activity    Alcohol use: Not Currently     Comment: QUIT DRINKING IN 2013    Drug use: Not Currently     Types: Marijuana     Comment: HAD MEDICAL CARD TO SMOKE  11/2/21    Sexual activity: Not Currently          Review of Systems   Constitutional:  Positive for activity change.   HENT: Negative.     Eyes: Negative.    Respiratory: Negative.     Cardiovascular: Negative.    Gastrointestinal: Negative.    Endocrine: Negative.    Genitourinary: Negative.    Musculoskeletal:  Positive for arthralgias, back pain and myalgias.   Skin: Negative.    Allergic/Immunologic: Negative.    Neurological:  Positive for numbness (tingling/bilateral legs).   Hematological:  "Negative.         Physical Examination:     Vitals:    02/29/24 1249   BP: 143/93   Pulse: 84   Weight: 67.4 kg (148 lb 8 oz)   Height: 149.7 cm (58.93\")   PainSc: 7  Comment: back pain        Physical Exam     Neurological Exam   Neurological examination does reveal right hand more than left hand and arm tremor at the end of an intended action.  There is also evidence of jaw tremor with her eyes being closed.    Result Review  The following data was reviewed by: Bob Carranza MD on 02/29/2024:    Data reviewed : Radiologic studies no imaging of the brain available in my study.      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  She appears to have progressive right arm greater than left arm intention tremor as well as some jaw tremor.  I believe that this is likely essential tremor.  I will refer her to neurology for further diagnostic testing and management.  She will return to see me for her scheduled scoliosis evaluation.  I have encouraged her to call with any questions or concerns.    Diagnoses and all orders for this visit:    1. Essential tremor (Primary)  -     Ambulatory Referral to Neurology         Return for As scheduled.            Bob Carranza MD  "

## 2024-02-29 ENCOUNTER — OFFICE VISIT (OUTPATIENT)
Dept: NEUROSURGERY | Facility: CLINIC | Age: 54
End: 2024-02-29
Payer: MEDICARE

## 2024-02-29 VITALS
HEIGHT: 59 IN | BODY MASS INDEX: 29.94 KG/M2 | HEART RATE: 84 BPM | WEIGHT: 148.5 LBS | SYSTOLIC BLOOD PRESSURE: 143 MMHG | DIASTOLIC BLOOD PRESSURE: 93 MMHG

## 2024-02-29 DIAGNOSIS — G25.0 ESSENTIAL TREMOR: Primary | ICD-10-CM

## 2024-03-08 ENCOUNTER — TELEPHONE (OUTPATIENT)
Dept: FAMILY MEDICINE CLINIC | Facility: CLINIC | Age: 54
End: 2024-03-08
Payer: MEDICARE

## 2024-03-08 NOTE — TELEPHONE ENCOUNTER
I do not supply [place] letters for jury duty.  The upcoming appointments that she has scheduled can be rescheduled with the offices and will take into consideration if jury duty occurs.

## 2024-03-08 NOTE — TELEPHONE ENCOUNTER
Patient got a Jury Duty notice and she is requesting a letter so that she does not have to sit for it.  She states she has several appointments in [place] that she can't miss and then she's worried about her having a trach.

## 2024-03-19 ENCOUNTER — LAB (OUTPATIENT)
Dept: LAB | Facility: HOSPITAL | Age: 54
End: 2024-03-19
Payer: MEDICARE

## 2024-03-19 ENCOUNTER — OFFICE VISIT (OUTPATIENT)
Dept: ENDOCRINOLOGY | Facility: CLINIC | Age: 54
End: 2024-03-19
Payer: MEDICARE

## 2024-03-19 VITALS
HEART RATE: 56 BPM | BODY MASS INDEX: 30.24 KG/M2 | DIASTOLIC BLOOD PRESSURE: 75 MMHG | OXYGEN SATURATION: 98 % | WEIGHT: 150 LBS | SYSTOLIC BLOOD PRESSURE: 135 MMHG | HEIGHT: 59 IN

## 2024-03-19 DIAGNOSIS — E28.39 PREMATURE OVARIAN FAILURE: ICD-10-CM

## 2024-03-19 DIAGNOSIS — R79.9 ABNORMAL FINDING OF BLOOD CHEMISTRY, UNSPECIFIED: ICD-10-CM

## 2024-03-19 DIAGNOSIS — E03.9 HYPOTHYROIDISM, UNSPECIFIED TYPE: ICD-10-CM

## 2024-03-19 DIAGNOSIS — E03.9 HYPOTHYROIDISM, UNSPECIFIED TYPE: Primary | ICD-10-CM

## 2024-03-19 DIAGNOSIS — E66.9 CLASS 1 OBESITY WITH BODY MASS INDEX (BMI) OF 30.0 TO 30.9 IN ADULT, UNSPECIFIED OBESITY TYPE, UNSPECIFIED WHETHER SERIOUS COMORBIDITY PRESENT: ICD-10-CM

## 2024-03-19 LAB
HBA1C MFR BLD: 5.7 % (ref 4.8–5.6)
T4 FREE SERPL-MCNC: 0.5 NG/DL (ref 0.93–1.7)
TSH SERPL DL<=0.05 MIU/L-ACNC: 24 UIU/ML (ref 0.27–4.2)

## 2024-03-19 PROCEDURE — 99204 OFFICE O/P NEW MOD 45 MIN: CPT | Performed by: INTERNAL MEDICINE

## 2024-03-19 PROCEDURE — 1160F RVW MEDS BY RX/DR IN RCRD: CPT | Performed by: INTERNAL MEDICINE

## 2024-03-19 PROCEDURE — 83036 HEMOGLOBIN GLYCOSYLATED A1C: CPT

## 2024-03-19 PROCEDURE — 84443 ASSAY THYROID STIM HORMONE: CPT

## 2024-03-19 PROCEDURE — 1159F MED LIST DOCD IN RCRD: CPT | Performed by: INTERNAL MEDICINE

## 2024-03-19 PROCEDURE — 84439 ASSAY OF FREE THYROXINE: CPT

## 2024-03-19 PROCEDURE — 36415 COLL VENOUS BLD VENIPUNCTURE: CPT

## 2024-03-19 RX ORDER — LEVOTHYROXINE SODIUM 0.1 MG/1
100 TABLET ORAL DAILY
Qty: 30 TABLET | Refills: 5 | Status: SHIPPED | OUTPATIENT
Start: 2024-03-19

## 2024-03-19 NOTE — PATIENT INSTRUCTIONS
Please,    #Levothyroxine Dosing:    - Start levothyroxine tablets 100 mcg by mouth daily at 6:00 AM  - Take it every day, on an empty stomach, 30 minutes before eating  - Avoid taking it with iron, calcium, other medications (blocks absorption), wait at least 4 hrs after taking levothyroxine to take these medications  - If you miss a pill, you can take 2 the next day and return to schedule from next day     Thank you for your visit today.    If you have any questions or concerns please feel free to reach out of the office.

## 2024-03-19 NOTE — PROGRESS NOTES
-----------------------------------------------------------------  ENDOCRINE CLINIC NOTE  -----------------------------------------------------------------        PATIENT NAME: Lizzie Campos  PATIENT : 1970 AGE: 53 y.o.  MRN NUMBER: [id number]  PRIMARY CARE: Ethel Blackman APRN    ==========================================================================    CHIEF COMPLAINT: Hypothyroidism  DATE OF SERVICE: 24    ==========================================================================    HPI / SUBJECTIVE    53 y.o. female is seen in the clinic today for hypothyroidism.  Known to have hypothyroidism since .  Off therapy since last 2 months time.  Never seen endocrinologist.  Family history significant for thyroid disorder, mother and paternal aunt.    - Fatigue: +ve  - Cold Intolerance: denied  - Weight gain: Persistently gaining weight with normal appetite  - Anxiety / Depression: Both on medications  - HTN: None  - Skin changes: None  - Hair changes: None  - Menstrual Hx: Hysterectomy at age 28, endometriosis patient reports to have oophorectomy along with hysterectomy but was not maintained on any hormone replacement therapy    Patient currently have tracheostomy tube in place given history of tracheomalacia.  Denied any thyroidectomy or any radiation exposure.    ==========================================================================                                                PAST MEDICAL HISTORY    Past Medical History:   Diagnosis Date    Anxiety     Arthritis     Depression     Encounter for hepatitis C screening test for low risk patient 2022    Headache     Hx of hepatitis C     NEGATIVE SINCE  WITH TREATMENT     Hypothyroidism     Low back pain        ==========================================================================    PAST SURGICAL HISTORY    Past Surgical History:   Procedure Laterality Date     SECTION      HERNIA REPAIR       HYSTERECTOMY      KIDNEY STONE SURGERY         ==========================================================================    FAMILY HISTORY    Family History   Problem Relation Age of Onset    Heart disease Mother     Breast cancer Mother     Cancer Mother     Thyroid disease Mother     Asthma Mother     Arthritis Mother     Heart disease Father     Hypertension Father     Diabetes Father     Cancer Father     Alcohol abuse Brother     Drug abuse Brother     Breast cancer Paternal Aunt        ==========================================================================    SOCIAL HISTORY    Social History     Socioeconomic History    Marital status: Single    Number of children: 1    Years of education: 18   Tobacco Use    Smoking status: Never    Smokeless tobacco: Never   Vaping Use    Vaping status: Former    Start date: 2/1/2021    Quit date: 4/1/2021    Substances: THC    Devices: Disposable   Substance and Sexual Activity    Alcohol use: Not Currently     Comment: QUIT DRINKING IN 2013    Drug use: Not Currently     Types: Marijuana     Comment: HAD MEDICAL CARD TO SMOKE  11/2/21    Sexual activity: Not Currently       ==========================================================================    MEDICATIONS      Current Outpatient Medications:     levothyroxine (Synthroid) 100 MCG tablet, Take 1 tablet by mouth Daily. (Patient not taking: Reported on 3/19/2024), Disp: 90 tablet, Rfl: 3    ==========================================================================    ALLERGIES    Allergies   Allergen Reactions    Penicillins Hives       ==========================================================================    OBJECTIVE    Vitals:    03/19/24 1320   BP: 135/75   Pulse: 56   SpO2: 98%     Body mass index is 30.37 kg/m².     General: Alert, cooperative, no acute distress  Thyroid: Tracheostomy tube in place  Lungs: Harsh vesicular  "breathing    ==========================================================================    LAB EVALUATION    Lab Results   Component Value Date    GLUCOSE 82 06/26/2023    BUN 15 06/26/2023    CREATININE 0.76 06/26/2023    EGFRIFNONA 84 01/13/2022    EGFRIFAFRI 97 01/13/2022    BCR 20 06/26/2023    K 5.0 06/26/2023    CO2 23 06/26/2023    CALCIUM 9.7 06/26/2023    PROTENTOTREF 7.5 06/26/2023    ALBUMIN 5.1 (H) 06/26/2023    LABIL2 2.1 06/26/2023    AST 13 06/26/2023    ALT 10 06/26/2023     Lab Results   Component Value Date    HGBA1C 5.6 06/26/2023     Lab Results   Component Value Date    CREATININE 0.76 06/26/2023     Lab Results   Component Value Date    TSH 2.340 12/05/2023    FREET4 1.20 08/18/2022       ==========================================================================    ASSESSMENT AND PLAN    # Hypothyroidism, not currently on levothyroxine replacement therapy  - Patient not taking any levothyroxine replacement therapy for last 2 months time  - Will start patient on levothyroxine 100 mcg every day  - Discussed with patient in detail about pathophysiology of thyroid gland and thyroid hormone to which she verbalized understanding  - Will start evaluation by repeat TSH and free T4 today follow-up with repeat thyroid function in 6 weeks and 3 months time  - Patient to follow-up in my clinic back again in 3 months time    # Premature ovarian failure    # Obesity with BMI of 30.37    Thank you for courtesy of consultation.    Return to clinic: 3 months    Entire assessment and plan was discussed and counseled the patient in detail to which patient verbalized understanding and agreed with care.  Answered all queries and concerns.    This note was created using voice recognition software and is inherently subject to errors Including those of syntax and \"sound-alike\" substitutions which may escape proofreading.  In such instances, original meaning may be extrapolated by contextual derivation.    Note: " Portions of this note may have been copied from previous notes but documentation have been reviewed and edited as necessary to support clinical decision making for today's visit.    ==========================================================================    INFORMATION PROVIDED TO PATIENT    Patient Instructions   Please,    #Levothyroxine Dosing:    - Start levothyroxine tablets 100 mcg by mouth daily at 6:00 AM  - Take it every day, on an empty stomach, 30 minutes before eating  - Avoid taking it with iron, calcium, other medications (blocks absorption), wait at least 4 hrs after taking levothyroxine to take these medications  - If you miss a pill, you can take 2 the next day and return to schedule from next day     Thank you for your visit today.    If you have any questions or concerns please feel free to reach out of the office.       ==========================================================================  Kavon Cordova MD  Department of Endocrine, Diabetes and Metabolism  [facility], [place]  ==========================================================================

## 2024-04-17 ENCOUNTER — TELEPHONE (OUTPATIENT)
Dept: SURGERY | Facility: CLINIC | Age: 54
End: 2024-04-17
Payer: MEDICARE

## 2024-04-17 ENCOUNTER — OFFICE VISIT (OUTPATIENT)
Dept: SURGERY | Facility: CLINIC | Age: 54
End: 2024-04-17
Payer: MEDICARE

## 2024-04-17 VITALS
SYSTOLIC BLOOD PRESSURE: 126 MMHG | BODY MASS INDEX: 29.43 KG/M2 | HEIGHT: 59 IN | HEART RATE: 78 BPM | WEIGHT: 146 LBS | DIASTOLIC BLOOD PRESSURE: 76 MMHG | OXYGEN SATURATION: 98 %

## 2024-04-17 DIAGNOSIS — Q79.0 MORGAGNI HERNIA: Primary | ICD-10-CM

## 2024-04-17 PROCEDURE — 1160F RVW MEDS BY RX/DR IN RCRD: CPT | Performed by: THORACIC SURGERY (CARDIOTHORACIC VASCULAR SURGERY)

## 2024-04-17 PROCEDURE — 1159F MED LIST DOCD IN RCRD: CPT | Performed by: THORACIC SURGERY (CARDIOTHORACIC VASCULAR SURGERY)

## 2024-04-17 PROCEDURE — 99204 OFFICE O/P NEW MOD 45 MIN: CPT | Performed by: THORACIC SURGERY (CARDIOTHORACIC VASCULAR SURGERY)

## 2024-04-17 NOTE — LETTER
May 3, 2024     YEIMY Claire  [address]  [address]  [place] [address]    Patient: Lizzie Campos   YOB: 1970   Date of Visit: 2024     Dear YEIMY Claire:       Thank you for referring Lizzie Campos to me for evaluation. Below are the relevant portions of my assessment and plan of care.    If you have questions, please do not hesitate to call me. I look forward to following Lizzie along with you.         Sincerely,        Magali Nava MD        CC: No Recipients    Magali Nava MD  24 1630  Sign when Signing Visit  Chief Complaint  morgagni    Subjective       Lizzie Campos presents to [facility] THORAC[facility] SURGERY  History of Present Illness    The patient is a 53-year-old female who presents to the clinic for a consultation for more morgagni hernia.    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 This necessitated the placement of a flap at the base of her colon and a food pipe, which prevents her from vomiting. Her medical records, obtained from birth to 10 years old, have been meticulously reviewed. Her G-tube was previously closed. She reports alternating constipation and loose stools, which she attributes to poor fat processing, particularly when consuming fatty foods. She has experienced significant weight gain, which is the heaviest she has ever weighed. She underwent a hysterectomy at the age of 28-year-old,  section, and hernia repair. She denies any anesthesia-related issues and has not undergone any chest surgeries. She reports difficulty breathing when elevating the head of the bed and a sensation of near syncope during physical activity. She has a pulmonary appointment scheduled for next week. She has researched her hernia and its potential contribution to her breathing difficulties, but is uncertain if this is related to her hernia. She has  "noticed postprandial abdominal distension, describing the sensation as similar to a pre-gastric bypass.    She has an appointment with her nephrologist tomorrow 04/18/2024. Currently, she has severe left-sided pain, so she thinks her kidney stone might be back.    Objective  Vital Signs:  /76   Pulse 78   Ht 149.9 cm (59\")   Wt 66.2 kg (146 lb)   SpO2 98%   BMI 29.49 kg/m²   Estimated body mass index is 29.49 kg/m² as calculated from the following:    Height as of this encounter: 149.9 cm (59\").    Weight as of this encounter: 66.2 kg (146 lb).       Physical Exam   Result Review:    Imaging  CT of the abdomen and pelvis performed on 2/21/2024, demonstrates an anterior diaphragmatic hernia in the medial portion of the right hemidiaphragm containing small intestine and omentum.                 Assessment and Plan     Diagnoses and all orders for this visit:    1. Morgagni hernia (Primary)  -     CT Chest Without Contrast; Future    The patient is a pleasant 53-year-old female with an anterior diaphragmatic hernia containing small bowel.    Anterior diaphragmatic hernia, containing small bowel.  She presents with a morgagni hernia, a condition that has been present throughout her life.   It is unclear whether the hernia is contributing to her constipation symptoms and her weight gain.   The hernia does not appear to be obstructed.   A CT scan of the chest will be ordered in approximately 6 months to evaluate the anatomical structure of her chest. Should the hernia increase in size, surgical intervention may be necessary.   In the interim, if she experiences constipation, severe pain, or any other changes, she is to contact us immediately.    Follow-up  She is scheduled for a follow-up visit in 6 months.     I spent 45 minutes caring for Lizzie on this date of service. This time includes time spent by me in the following activities:preparing for the visit, reviewing tests, obtaining and/or reviewing a " separately obtained history, performing a medically appropriate examination and/or evaluation , counseling and educating the patient/family/caregiver, ordering medications, tests, or procedures, documenting information in the medical record, and independently interpreting results and communicating that information with the patient/family/caregiver  Follow Up     No follow-ups on file.  Patient was given instructions and counseling regarding her condition or for health maintenance advice. Please see specific information pulled into the AVS if appropriate.         Transcribed from ambient dictation for Magali Nava MD by Mini Turner.  04/17/24   16:34 EDT    Patient or patient representative verbalized consent to the visit recording.  I have personally performed the services described in this document as transcribed by the above individual, and it is both accurate and complete.

## 2024-04-17 NOTE — PROGRESS NOTES
"Chief Complaint  morgagni    Subjective        Lizzie Campos presents to [facility] THORACIC SURGERY  History of Present Illness    The patient is a 53-year-old female who presents to the clinic for a consultation for more morgagni hernia.    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 This necessitated the placement of a flap at the base of her colon and a food pipe, which prevents her from vomiting. Her medical records, obtained from birth to 10 years old, have been meticulously reviewed. Her G-tube was previously closed. She reports alternating constipation and loose stools, which she attributes to poor fat processing, particularly when consuming fatty foods. She has experienced significant weight gain, which is the heaviest she has ever weighed. She underwent a hysterectomy at the age of 28-year-old,  section, and hernia repair. She denies any anesthesia-related issues and has not undergone any chest surgeries. She reports difficulty breathing when elevating the head of the bed and a sensation of near syncope during physical activity. She has a pulmonary appointment scheduled for next week. She has researched her hernia and its potential contribution to her breathing difficulties, but is uncertain if this is related to her hernia. She has noticed postprandial abdominal distension, describing the sensation as similar to a pre-gastric bypass.    She has an appointment with her nephrologist tomorrow 2024. Currently, she has severe left-sided pain, so she thinks her kidney stone might be back.    Objective   Vital Signs:  /76   Pulse 78   Ht 149.9 cm (59\")   Wt 66.2 kg (146 lb)   SpO2 98%   BMI 29.49 kg/m²   Estimated body mass index is 29.49 kg/m² as calculated from the following:    Height as of this encounter: 149.9 cm (59\").    Weight as of this encounter: 66.2 kg (146 lb).       Physical Exam "   Result Review :    Imaging  CT of the abdomen and pelvis performed on 2/21/2024, demonstrates an anterior diaphragmatic hernia in the medial portion of the right hemidiaphragm containing small intestine and omentum.                 Assessment and Plan     Diagnoses and all orders for this visit:    1. Morgagni hernia (Primary)  -     CT Chest Without Contrast; Future    The patient is a pleasant 53-year-old female with an anterior diaphragmatic hernia containing small bowel.    Anterior diaphragmatic hernia, containing small bowel.  She presents with a morgagni hernia, a condition that has been present throughout her life.   It is unclear whether the hernia is contributing to her constipation symptoms and her weight gain.   The hernia does not appear to be obstructed.   A CT scan of the chest will be ordered in approximately 6 months to evaluate the anatomical structure of her chest. Should the hernia increase in size, surgical intervention may be necessary.   In the interim, if she experiences constipation, severe pain, or any other changes, she is to contact us immediately.    Follow-up  She is scheduled for a follow-up visit in 6 months.     I spent 45 minutes caring for Lizzie on this date of service. This time includes time spent by me in the following activities:preparing for the visit, reviewing tests, obtaining and/or reviewing a separately obtained history, performing a medically appropriate examination and/or evaluation , counseling and educating the patient/family/caregiver, ordering medications, tests, or procedures, documenting information in the medical record, and independently interpreting results and communicating that information with the patient/family/caregiver  Follow Up     No follow-ups on file.  Patient was given instructions and counseling regarding her condition or for health maintenance advice. Please see specific information pulled into the AVS if appropriate.         Transcribed from  ambient dictation for Magali Nava MD by Mini Turner.  04/17/24   16:34 EDT    Patient or patient representative verbalized consent to the visit recording.  I have personally performed the services described in this document as transcribed by the above individual, and it is both accurate and complete.

## 2024-04-17 NOTE — TELEPHONE ENCOUNTER
Spoke with patient confirming 4/17 appt w/Elijah @[facility]. Patient was understanding and acceptable.

## 2024-04-19 ENCOUNTER — TELEPHONE (OUTPATIENT)
Dept: FAMILY MEDICINE CLINIC | Facility: CLINIC | Age: 54
End: 2024-04-19
Payer: MEDICARE

## 2024-04-19 NOTE — TELEPHONE ENCOUNTER
Spoke with pt, let her know that Ethel done gone for the day and advised she could try aleve or something else over the counter, pt has appt on Monday and will follow up then

## 2024-04-19 NOTE — TELEPHONE ENCOUNTER
Caller: Lizzie Campos    Relationship: Self    Best call back number: [phone]     What medication are you requesting: TYLENOL 3    What are your current symptoms: KIDNEY STONES    If a prescription is needed, what is your preferred pharmacy and phone number: [facility] [address], [place] - [address] [facility] [phone] [facility] [phone]      Additional notes: PATIENT STATED THAT SHE WAS SEEN IN THE ER YESTERDAY FOR KIDNEY STONES. PATIENT STATED SHE HAS BEEN TAKING OVER THE COUNTER MEDICATION BUT IT IS NOT HELPING THE PAIN    PATIENT IS REQUESTING TO KNOW IF THERE IS ANYTHING THAT SHE CAN TAKE THAT IS NOT A NARCOTIC OR IF SHE CAN BE PRESCRIBED TYLENOL 3 TO HELP WITH THE PAIN    PLEASE ADVISE

## 2024-06-14 NOTE — PROGRESS NOTES
Chief Complaint  Tremor    Subjective            Lizzie Campos presents to [facility] NEUROLOGY for tremor.  History of Present Illness    Lizzie Campos is a 53-year-old right-handed female seen today for tremor that began in her right hand 2 to 3 years ago.  Patient is referred by Dr. Carranza ([facility]). She states that this has progressed and she has trouble with spilling fluids from a cup if it does not have a lid and has switched to using a spoon rather than a fork to eat.  She has noticed some tremor in her left hand, but this is not bothersome because she is right-hand dominant.  Patient has scoliosis and lumbar radiculopathy/stenosis which affects function of her left leg.  Patient denies having bradykinesia or rigidity.  She does have bladder incontinence but this is longstanding.  She has more trouble with handwriting but it is due to shaking.  Patient does have some anxiety but has not paid attention to whether there is a difference in her tremor when she takes BuSpar.  She drinks about 32 ounces of sweet tea a day.  Denies having any family history of tremor or Parkinson's disease.    Patient expresses concern about tremor being related to multiple sclerosis or Parkinson's disease.      Complaint: Tremor  Onset: Few years, getting worse.  Can hardly hold a cup anymore and has to be careful cooking and trying to season food.  Location:  both hands. Right hand is worse. She he also has trouble with her legs  quality: shaking  Duration: intermittent  Frequency: daily  Timing: when she is being active  Worsening factors:using right hand  Alleviating factors: rest  associated Signs and symptoms: leg jerking, restless legs  Current meds: none  Past medications:           ------------------------------------------------------------------------------------------------------------------------------------------------------------------------------------------  02/29/2024 Bob Carranza  OV    Previous treatment:     HPI: This is a 53-year-old woman who I last evaluated on November 3, 2023.  She has a current BMI of 30 up from 28 at my last evaluation as well as 25 at the evaluation before.  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  She has a history of known scoliosis since childhood and continues to follow me for surveillance management of this.  She presents today for an unscheduled evaluation to evaluate her tremors.  She has had approximately 3 years of tremors.  She notices that it is worsening.  The tremor is worse in the right arm than the left arm.  She also has a tremor in her jaw.  She does not have any tremors in her legs.  She is never had any treatment for this.  This is not and action/intention tremor.  She does not have any cognitive decline associated with this.  She does not have any apathy.  She does not have any rigidity.    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  She appears to have progressive right arm greater than left arm intention tremor as well as some jaw tremor.  I believe that this is likely essential tremor.  I will refer her to neurology for further diagnostic testing and management.  She will return to see me for her scheduled scoliosis evaluation.  I have encouraged her to call with any questions or concerns.  Diagnoses and all orders for this visit:  1. Essential tremor (Primary)  -     Ambulatory Referral to Neurology  Return for As scheduled.        Family History   Problem Relation Age of Onset    Heart disease Mother     Breast cancer Mother     Cancer Mother     Thyroid disease Mother     Asthma Mother     Arthritis Mother     Heart disease Father     Hypertension Father     Diabetes Father     Cancer  "Father     Alcohol abuse Brother     Drug abuse Brother     Breast cancer Paternal Aunt        Past Medical History:   Diagnosis Date    Anxiety     Arthritis     Depression     Encounter for hepatitis C screening test for low risk patient 02/28/2022    Headache     Hx of hepatitis C     NEGATIVE SINCE 2005 WITH TREATMENT     Hypothyroidism     Low back pain        Social History     Socioeconomic History    Marital status: Single    Number of children: 1    Years of education: 18   Tobacco Use    Smoking status: Never    Smokeless tobacco: Never   Vaping Use    Vaping status: Former    Start date: 2/1/2021    Quit date: 4/1/2021    Substances: THC    Devices: Disposable   Substance and Sexual Activity    Alcohol use: Not Currently     Comment: QUIT DRINKING IN 2013    Drug use: Not Currently     Types: Marijuana     Comment: HAD MEDICAL CARD TO SMOKE  11/2/21    Sexual activity: Not Currently         Current Outpatient Medications:     levothyroxine (Synthroid) 100 MCG tablet, Take 1 tablet by mouth Daily., Disp: 30 tablet, Rfl: 5    busPIRone (BUSPAR) 5 MG tablet, Take 1 tablet by mouth Daily As Needed., Disp: , Rfl:     Review of Systems   Neurological:  Positive for tremors and numbness.   All other systems reviewed and are negative.           Objective   Vital Signs:   /78   Pulse 73   Ht 149.9 cm (59\")   Wt 67.6 kg (149 lb)   BMI 30.09 kg/m²     Physical Exam  Vitals reviewed.   Constitutional:       Appearance: She is well-developed.   HENT:      Head: Normocephalic and atraumatic.   Eyes:      Extraocular Movements: Extraocular movements intact and EOM normal.      Pupils: Pupils are equal, round, and reactive to light.   Neck:      Trachea: Tracheostomy present.   Cardiovascular:      Rate and Rhythm: Normal rate.      Heart sounds: No murmur heard.  Pulmonary:      Effort: Pulmonary effort is normal.      Comments: Shortness of breath with exertion  Musculoskeletal:      Cervical back: Normal " range of motion and neck supple.   Skin:     General: Skin is warm and dry.      Findings: No rash.   Neurological:      Mental Status: She is alert and oriented to person, place, and time.      Cranial Nerves: No cranial nerve deficit.      Sensory: No sensory deficit.      Motor: Motor function is intact. Tremor present. No weakness, atrophy or abnormal muscle tone.      Coordination: Romberg sign negative. Finger-Nose-Finger Test and Romberg Test normal.      Gait: Gait is intact. Tandem walk normal.      Deep Tendon Reflexes: Reflexes are normal and symmetric.      Reflex Scores:       Tricep reflexes are 2+ on the right side and 2+ on the left side.       Bicep reflexes are 2+ on the right side and 2+ on the left side.       Brachioradialis reflexes are 2+ on the right side and 2+ on the left side.       Patellar reflexes are 2+ on the right side and 2+ on the left side.       Achilles reflexes are 2+ on the right side and 2+ on the left side.  Psychiatric:         Attention and Perception: Attention normal.         Mood and Affect: Mood normal.         Speech: Speech normal.         Behavior: Behavior normal.         Cognition and Memory: Cognition and memory normal.         Judgment: Judgment normal.        Result Review :   The following data was reviewed by: YEIMY Maurer on 06/20/2024:      TSH          12/5/2023    13:21 3/19/2024    14:14 6/18/2024    13:28   TSH   TSH 2.340  24.000  1.570    Globin A1c 5.7       Reviewed CT lumbar spine 3/15/2022  Limited due to developmental changes and scoliosis.  Appears to have a 6 nonrib-bearing lumbar vertebrae.  Suggested compression deformity of L1.  Mild foramina narrowing on the left L4-S1 with moderate central narrowing at L5-L6.          Neurologic Exam     Mental Status   Oriented to person, place, and time.   Attention: normal.   Speech: speech is normal   Level of consciousness: alert    Cranial Nerves     CN II   Visual fields full to  confrontation.     CN III, IV, VI   Pupils are equal, round, and reactive to light.  Extraocular motions are normal.     CN V   Facial sensation intact.     CN VII   Facial expression full, symmetric.     CN VIII   CN VIII normal.     CN IX, X   CN IX normal.   CN X normal.     CN XI   CN XI normal.     CN XII   CN XII normal.     Motor Exam   Muscle bulk: normal  Overall muscle tone: normal  Right arm pronator drift: absent  Left arm pronator drift: absent    Strength   Right deltoid: 5/5  Left deltoid: 5/5  Right biceps: 5/5  Left biceps: 5/5  Right triceps: 5/5  Left triceps: 5/5  Right wrist flexion: 5/5  Left wrist flexion: 5/5  Right wrist extension: 5/5  Left wrist extension: 5/5  Right interossei: 5/5  Left interossei: 5/5  Right iliopsoas: 5/5  Left iliopsoas: 5/5  Right quadriceps: 5/5  Left quadriceps: 5/5  Right hamstrin/5  Left hamstrin/5  Right anterior tibial: 5/5  Left anterior tibial: 5/5  Right gastroc: 5/5  Left gastroc: 5/5    Sensory Exam   Light touch normal.   Vibration normal.     Gait, Coordination, and Reflexes     Gait  Gait: normal    Coordination   Romberg: negative  Finger to nose coordination: normal  Tandem walking coordination: normal    Tremor   Resting tremor: absent    Reflexes   Reflexes 2+ except as noted.   Right brachioradialis: 2+  Left brachioradialis: 2+  Right biceps: 2+  Left biceps: 2+  Right triceps: 2+  Left triceps: 2+  Right patellar: 2+  Left patellar: 2+  Right achilles: 2+  Left achilles: 2+  High-frequency low amplitude tremor in both hands postural, resolves at rest        Assessment and Plan    Diagnoses and all orders for this visit:    1. Benign essential tremor (Primary)  -     MRI Brain With & Without Contrast; Future      Lizzie Campos is seen today as a new patient for tremor.  Symptoms started 2 to 3 years ago and has slowly gotten worse, now affects both arms.  She is right-handed and appreciates the tremor more in this arm due to dominance.   Tremor affects ability to hold cups and use utensils.      On physical exam, the patient has a high frequency, low amplitude postural tremor in both hands that resolves at rest.  The patient has fluid gait with good arm swing bilaterally.  No resting tremor.  No bradykinesia or rigidity.    Essential tremor.  I discussed with the patient the difference between Parkinson's and essential tremor.  Patient remains concerned  about whether this could be related to multiple sclerosis.  Will order MRI brain with and without contrast.    We discussed starting medication, but at this time the patient would like to have testing done first and then consider medication given the possibility of side effects.    I recommend she decrease the amount of caffeine daily.  Also discussed that increased stressors can worsen tremor.  May try assistive devices like weighted utensils to assist with eating and drinking.    Patient will follow-up at this time as needed.  I will contact the patient if her MRI brain is abnormal.  She also will contact me if she changes her mind about starting medication (propranolol).      Follow Up   Return if symptoms worsen or fail to improve.  Patient was given instructions and counseling regarding her condition or for health maintenance advice. Please see specific information pulled into the AVS if appropriate.         This document has been electronically signed by YEIMY Sanchez on June 20, 2024 14:52 EDT

## 2024-06-19 ENCOUNTER — TELEPHONE (OUTPATIENT)
Dept: ENDOCRINOLOGY | Facility: CLINIC | Age: 54
End: 2024-06-19

## 2024-06-19 LAB
T4 FREE SERPL-MCNC: 1.36 NG/DL (ref 0.82–1.77)
TSH SERPL DL<=0.005 MIU/L-ACNC: 1.57 UIU/ML (ref 0.45–4.5)

## 2024-06-19 NOTE — TELEPHONE ENCOUNTER
Caller: Lizzie Campos    Relationship to patient: Self    Best call back number: [phone]    Patient is needing: PT HAD HER TSH LABS DRAWN IN [place]. PT WANTS TO KNOW IF DR AOLNSO CAN LOOK AT THE RESULTS AND SEE IF SHE STILL NEEDS TO COME TO HER APPT FRIDAY. PLEASE CALL PT TO ADVISE

## 2024-06-19 NOTE — TELEPHONE ENCOUNTER
Spoke with patient to let her know we do have her labs and she will need to keep follow up Friday. She voiced understanding.

## 2024-06-20 ENCOUNTER — OFFICE VISIT (OUTPATIENT)
Dept: NEUROLOGY | Facility: CLINIC | Age: 54
End: 2024-06-20
Payer: MEDICARE

## 2024-06-20 VITALS
HEIGHT: 59 IN | WEIGHT: 149 LBS | HEART RATE: 73 BPM | SYSTOLIC BLOOD PRESSURE: 122 MMHG | BODY MASS INDEX: 30.04 KG/M2 | DIASTOLIC BLOOD PRESSURE: 78 MMHG

## 2024-06-20 DIAGNOSIS — G25.0 BENIGN ESSENTIAL TREMOR: Primary | ICD-10-CM

## 2024-06-20 RX ORDER — BUSPIRONE HYDROCHLORIDE 5 MG/1
5 TABLET ORAL DAILY PRN
COMMUNITY

## 2024-06-21 ENCOUNTER — OFFICE VISIT (OUTPATIENT)
Dept: ENDOCRINOLOGY | Facility: CLINIC | Age: 54
End: 2024-06-21
Payer: MEDICARE

## 2024-06-21 VITALS
SYSTOLIC BLOOD PRESSURE: 110 MMHG | DIASTOLIC BLOOD PRESSURE: 68 MMHG | HEIGHT: 59 IN | OXYGEN SATURATION: 95 % | WEIGHT: 150 LBS | BODY MASS INDEX: 30.24 KG/M2 | HEART RATE: 88 BPM

## 2024-06-21 DIAGNOSIS — E66.9 CLASS 1 OBESITY WITH BODY MASS INDEX (BMI) OF 30.0 TO 30.9 IN ADULT, UNSPECIFIED OBESITY TYPE, UNSPECIFIED WHETHER SERIOUS COMORBIDITY PRESENT: ICD-10-CM

## 2024-06-21 DIAGNOSIS — E03.9 HYPOTHYROIDISM, UNSPECIFIED TYPE: Primary | ICD-10-CM

## 2024-06-21 DIAGNOSIS — E28.39 PREMATURE OVARIAN FAILURE: ICD-10-CM

## 2024-06-21 PROCEDURE — 99214 OFFICE O/P EST MOD 30 MIN: CPT | Performed by: INTERNAL MEDICINE

## 2024-06-21 PROCEDURE — 1159F MED LIST DOCD IN RCRD: CPT | Performed by: INTERNAL MEDICINE

## 2024-06-21 PROCEDURE — 1160F RVW MEDS BY RX/DR IN RCRD: CPT | Performed by: INTERNAL MEDICINE

## 2024-06-21 RX ORDER — LEVOTHYROXINE SODIUM 0.1 MG/1
100 TABLET ORAL DAILY
Qty: 90 TABLET | Refills: 2 | Status: SHIPPED | OUTPATIENT
Start: 2024-06-21

## 2024-06-21 NOTE — PROGRESS NOTES
-----------------------------------------------------------------  ENDOCRINE CLINIC NOTE  -----------------------------------------------------------------        PATIENT NAME: Lizzie Campos  PATIENT : 1970 AGE: 53 y.o.  MRN NUMBER: [id number]  PRIMARY CARE: Ethel Blackman APRN    ==========================================================================    CHIEF COMPLAINT: Hypothyroidism  DATE OF SERVICE: 24    ==========================================================================    HPI / SUBJECTIVE    53 y.o. female is seen in the clinic today for hypothyroidism.  Known to have hypothyroidism since .  Current therapy: levothyroxine 100 mcg p.o. daily.  Patient is currently properly taking medication.  Family history significant for thyroid disorder, mother and paternal aunt.  Patient currently have tracheostomy tube in place given history of tracheomalacia.  Denied any thyroidectomy or any radiation exposure.    ==========================================================================                                                PAST MEDICAL HISTORY    Past Medical History:   Diagnosis Date    Anxiety     Arthritis     Depression     Encounter for hepatitis C screening test for low risk patient 2022    Headache     Hx of hepatitis C     NEGATIVE SINCE  WITH TREATMENT     Hypothyroidism     Low back pain        ==========================================================================    PAST SURGICAL HISTORY    Past Surgical History:   Procedure Laterality Date     SECTION      HERNIA REPAIR      HYSTERECTOMY      KIDNEY STONE SURGERY         ==========================================================================    FAMILY HISTORY    Family History   Problem Relation Age of Onset    Heart disease Mother     Breast cancer Mother     Cancer Mother     Thyroid disease Mother     Asthma Mother     Arthritis Mother     Heart disease Father      Hypertension Father     Diabetes Father     Cancer Father     Alcohol abuse Brother     Drug abuse Brother     Breast cancer Paternal Aunt        ==========================================================================    SOCIAL HISTORY    Social History     Socioeconomic History    Marital status: Single    Number of children: 1    Years of education: 18   Tobacco Use    Smoking status: Never    Smokeless tobacco: Never   Vaping Use    Vaping status: Former    Start date: 2/1/2021    Quit date: 4/1/2021    Substances: THC    Devices: Disposable   Substance and Sexual Activity    Alcohol use: Not Currently     Comment: QUIT DRINKING IN 2013    Drug use: Not Currently     Types: Marijuana     Comment: HAD MEDICAL CARD TO SMOKE  11/2/21    Sexual activity: Not Currently       ==========================================================================    MEDICATIONS      Current Outpatient Medications:     busPIRone (BUSPAR) 5 MG tablet, Take 1 tablet by mouth Daily As Needed., Disp: , Rfl:     levothyroxine (Synthroid) 100 MCG tablet, Take 1 tablet by mouth Daily., Disp: 30 tablet, Rfl: 5    ==========================================================================    ALLERGIES    Allergies   Allergen Reactions    Penicillins Hives       ==========================================================================    OBJECTIVE    Vitals:    06/21/24 1152   BP: 110/68   Pulse: 88   SpO2: 95%     Body mass index is 30.3 kg/m².     General: Alert, cooperative, no acute distress  Thyroid: Tracheostomy tube in place  Lungs: Harsh vesicular breathing    ==========================================================================    LAB EVALUATION    Lab Results   Component Value Date    GLUCOSE 82 06/26/2023    BUN 15 06/26/2023    CREATININE 0.76 06/26/2023    EGFRIFNONA 84 01/13/2022    EGFRIFAFRI 97 01/13/2022    BCR 20 06/26/2023    K 5.0 06/26/2023    CO2 23 06/26/2023    CALCIUM 9.7 06/26/2023    PROTENTOTREF 7.5  "06/26/2023    ALBUMIN 5.1 (H) 06/26/2023    LABIL2 2.1 06/26/2023    AST 13 06/26/2023    ALT 10 06/26/2023     Lab Results   Component Value Date    HGBA1C 5.70 (H) 03/19/2024    HGBA1C 5.6 06/26/2023     Lab Results   Component Value Date    CREATININE 0.76 06/26/2023     Lab Results   Component Value Date    TSH 1.570 06/18/2024    FREET4 1.36 06/18/2024       ==========================================================================    ASSESSMENT AND PLAN    # Hypothyroidism  - Continue levothyroxine 100 mcg p.o. daily  - Repeat thyroid function back again in 3 months and in 6 months time  - Clinical follow-up in 6 months time    # Premature ovarian failure    # Obesity with BMI of 30.30, discussed with patient about dietary modification in the future if needed will try for GLP-1 receptor agonist therapy    Thank you for courtesy of consultation.    Return to clinic: 6 months    Entire assessment and plan was discussed and counseled the patient in detail to which patient verbalized understanding and agreed with care.  Answered all queries and concerns.    This note was created using voice recognition software and is inherently subject to errors Including those of syntax and \"sound-alike\" substitutions which may escape proofreading.  In such instances, original meaning may be extrapolated by contextual derivation.    Note: Portions of this note may have been copied from previous notes but documentation have been reviewed and edited as necessary to support clinical decision making for today's visit.    ==========================================================================    INFORMATION PROVIDED TO PATIENT    Patient Instructions   Please,    #Levothyroxine Dosing:    - Continue levothyroxine tablets 100 mcg by mouth daily at 6:00 AM  - Take it every day, on an empty stomach, 30 minutes before eating  - Avoid taking it with iron, calcium, other medications (blocks absorption), wait at least 4 hrs after taking " levothyroxine to take these medications  - If you miss a pill, you can take 2 the next day and return to schedule from next day     Thank you for your visit today.    Blood work repeat in 3 months and in 6 months time.    If you have any questions or concerns please feel free to reach out of the office.       ==========================================================================  Kavon Cordova MD  Department of Endocrine, Diabetes and Metabolism  [place], [place]  ==========================================================================

## 2024-06-21 NOTE — PATIENT INSTRUCTIONS
Please,    #Levothyroxine Dosing:    - Continue levothyroxine tablets 100 mcg by mouth daily at 6:00 AM  - Take it every day, on an empty stomach, 30 minutes before eating  - Avoid taking it with iron, calcium, other medications (blocks absorption), wait at least 4 hrs after taking levothyroxine to take these medications  - If you miss a pill, you can take 2 the next day and return to schedule from next day     Thank you for your visit today.    Blood work repeat in 3 months and in 6 months time.    If you have any questions or concerns please feel free to reach out of the office.

## 2024-07-12 ENCOUNTER — HOSPITAL ENCOUNTER (OUTPATIENT)
Dept: MRI IMAGING | Facility: HOSPITAL | Age: 54
Discharge: HOME | End: 2024-07-12
Payer: MEDICARE

## 2024-07-12 DIAGNOSIS — G25.0 BENIGN ESSENTIAL TREMOR: ICD-10-CM

## 2024-07-12 PROCEDURE — A9579 GAD-BASE MR CONTRAST NOS,1ML: HCPCS | Performed by: NURSE PRACTITIONER

## 2024-07-12 PROCEDURE — 70553 MRI BRAIN STEM W/O & W/DYE: CPT

## 2024-07-12 PROCEDURE — 25010000002 GADOTERIDOL PER 1 ML: Performed by: NURSE PRACTITIONER

## 2024-07-12 RX ADMIN — GADOTERIDOL 15 ML: 279.3 INJECTION, SOLUTION INTRAVENOUS at 17:05

## 2024-07-30 ENCOUNTER — OFFICE VISIT (OUTPATIENT)
Dept: FAMILY MEDICINE CLINIC | Facility: CLINIC | Age: 54
End: 2024-07-30
Payer: MEDICARE

## 2024-07-30 VITALS
HEART RATE: 71 BPM | BODY MASS INDEX: 30.84 KG/M2 | OXYGEN SATURATION: 94 % | SYSTOLIC BLOOD PRESSURE: 107 MMHG | HEIGHT: 59 IN | DIASTOLIC BLOOD PRESSURE: 78 MMHG | WEIGHT: 153 LBS | TEMPERATURE: 98.1 F

## 2024-07-30 DIAGNOSIS — Z00.00 ENCOUNTER FOR SUBSEQUENT ANNUAL WELLNESS VISIT (AWV) IN MEDICARE PATIENT: Primary | ICD-10-CM

## 2024-07-30 DIAGNOSIS — Z71.89 ACP (ADVANCE CARE PLANNING): ICD-10-CM

## 2024-07-30 DIAGNOSIS — E03.9 HYPOTHYROIDISM, UNSPECIFIED TYPE: ICD-10-CM

## 2024-07-30 PROBLEM — R31.9 HEMATURIA: Status: RESOLVED | Noted: 2023-10-30 | Resolved: 2024-07-30

## 2024-07-30 PROBLEM — N30.01 ACUTE CYSTITIS WITH HEMATURIA: Status: RESOLVED | Noted: 2022-10-21 | Resolved: 2024-07-30

## 2024-07-30 PROBLEM — Z23 NEED FOR INFLUENZA VACCINATION: Status: RESOLVED | Noted: 2022-10-21 | Resolved: 2024-07-30

## 2024-07-30 PROCEDURE — 1170F FXNL STATUS ASSESSED: CPT | Performed by: NURSE PRACTITIONER

## 2024-07-30 PROCEDURE — 1159F MED LIST DOCD IN RCRD: CPT | Performed by: NURSE PRACTITIONER

## 2024-07-30 PROCEDURE — 1160F RVW MEDS BY RX/DR IN RCRD: CPT | Performed by: NURSE PRACTITIONER

## 2024-07-30 PROCEDURE — G0439 PPPS, SUBSEQ VISIT: HCPCS | Performed by: NURSE PRACTITIONER

## 2024-07-30 PROCEDURE — 1125F AMNT PAIN NOTED PAIN PRSNT: CPT | Performed by: NURSE PRACTITIONER

## 2024-07-30 RX ORDER — ALBUTEROL SULFATE AND BUDESONIDE 90; 80 UG/1; UG/1
AEROSOL, METERED RESPIRATORY (INHALATION)
COMMUNITY

## 2024-07-30 NOTE — PROGRESS NOTES
Subjective   The ABCs of the Annual Wellness Visit  Medicare Wellness Visit      Lizzie Campos is a 53 y.o. patient who presents for a Medicare Wellness Visit.    The following portions of the patient's history were reviewed and   updated as appropriate: allergies, current medications, past family history, past medical history, past social history, past surgical history, and problem list.    Compared to one year ago, the patient's physical   health is worse. Continues to see pulmonology due to pulmonary issues, she is on AirSupra. She did have CT scan done yesterday to evaluate pulmonary status.     Compared to one year ago, the patient's mental   health is the same.    Recent Hospitalizations:  She was not admitted to the hospital during the last year.     Current Medical Providers:  Patient Care Team:  Ethel Blackman APRN as PCP - General (Nurse Practitioner)    Outpatient Medications Prior to Visit   Medication Sig Dispense Refill    Airsupra 90-80 MCG/ACT aerosol       busPIRone (BUSPAR) 5 MG tablet Take 1 tablet by mouth Daily As Needed.      levothyroxine (Synthroid) 100 MCG tablet Take 1 tablet by mouth Daily. 90 tablet 2     No facility-administered medications prior to visit.     No opioid medication identified on active medication list. I have reviewed chart for other potential  high risk medication/s and harmful drug interactions in the elderly.      Aspirin is not on active medication list.  Aspirin use is not indicated based on review of current medical condition/s. Risk of harm outweighs potential benefits.  .    Patient Active Problem List   Diagnosis    Infantile idiopathic scoliosis of thoracolumbar region    Anxiety    Establishing care with new doctor, encounter for    Vitamin B 12 deficiency    Irritable bowel syndrome with diarrhea    Hypothyroidism    Encounter for annual wellness visit (AWV) in Medicare patient    Tracheostomy care    Infantile idiopathic scoliosis    Chronic back  "pain    Pain of toe of left foot    Pain in left leg    Contusion of multiple sites of left leg    Flank pain    Encounter for screening mammogram for breast cancer    Breast lesion    Family history of breast cancer    Pap smear for cervical cancer screening    Left flank pain    Kidney stone    Hiatal hernia    Intussusception    Intussusception of small bowel    Diaphragmatic hernia without obstruction and without gangrene    Intestine, malrotation    Tracheostomy in place    SOB (shortness of breath)    Tracheostomy dependent     Advance Care Planning (Click this link to access ACP Navigator)  Advance Directive is on file.  ACP discussion was held with the patient during this visit. Patient has an advance directive in EMR which is still valid.         Objective   Vitals:    24 1533   BP: 107/78   BP Location: Right arm   Patient Position: Sitting   Cuff Size: Adult   Pulse: 71   Temp: 98.1 °F (36.7 °C)   TempSrc: Infrared   SpO2: 94%   Weight: 69.4 kg (153 lb)   Height: 149.9 cm (59.02\")       Estimated body mass index is 30.89 kg/m² as calculated from the following:    Height as of this encounter: 149.9 cm (59.02\").    Weight as of this encounter: 69.4 kg (153 lb).             Does the patient have evidence of cognitive impairment? No                                                                                               Health  Risk Assessment    Smoking Status:  Social History     Tobacco Use   Smoking Status Never    Passive exposure: Never   Smokeless Tobacco Never     Alcohol Consumption:  Social History     Substance and Sexual Activity   Alcohol Use Not Currently    Comment: QUIT DRINKING IN      Fall Risk Screen:  EDISON Fall Risk Assessment was completed, and patient is at LOW risk for falls.Assessment completed on:2024    Depression Screenin/30/2024     3:39 PM   PHQ-2/PHQ-9 Depression Screening   Little Interest or Pleasure in Doing Things 0-->not at all   Feeling Down, " Depressed or Hopeless 1-->several days   PHQ-9: Brief Depression Severity Measure Score 1     Health Habits and Functional and Cognitive Screenin/30/2024     3:36 PM   Functional & Cognitive Status   Do you have difficulty preparing food and eating? No   Do you have difficulty bathing yourself, getting dressed or grooming yourself? No   Do you have difficulty using the toilet? No   Do you have difficulty moving around from place to place? No   Do you have trouble with steps or getting out of a bed or a chair? Yes   Current Diet Well Balanced Diet   Dental Exam Not up to date   Eye Exam Up to date   Exercise (times per week) 0 times per week   Current Exercises Include No Regular Exercise   Do you need help using the phone?  No   Are you deaf or do you have serious difficulty hearing?  No   Do you need help to go to places out of walking distance? Yes   Do you need help shopping? Yes   Do you need help preparing meals?  Yes   Do you need help with housework?  Yes   Do you need help with laundry? No   Do you need help taking your medications? No   Do you need help managing money? No   Do you ever drive or ride in a car without wearing a seat belt? No   Have you felt unusual stress, anger or loneliness in the last month? Yes   Who do you live with? Alone   If you need help, do you have trouble finding someone available to you? No   Have you been bothered in the last four weeks by sexual problems? No   Do you have difficulty concentrating, remembering or making decisions? No             Age-appropriate Screening Schedule:  Refer to the list below for future screening recommendations based on patient's age, sex and/or medical conditions. Orders for these recommended tests are listed in the plan section. The patient has been provided with a written plan.    Health Maintenance List  Health Maintenance   Topic Date Due    ZOSTER VACCINE (1 of 2) Never done    INFLUENZA VACCINE  2024    BMI FOLLOWUP   11/03/2024    MAMMOGRAM  12/27/2024    ANNUAL WELLNESS VISIT  07/30/2025    TDAP/TD VACCINES (2 - Td or Tdap) 12/29/2030    COLORECTAL CANCER SCREENING  02/09/2031    HEPATITIS C SCREENING  Completed    COVID-19 Vaccine  Completed    Pneumococcal Vaccine 0-64  Aged Out                                                                                                                                                CMS Preventative Services Quick Reference  Risk Factors Identified During Encounter  Immunizations Discussed/Encouraged: Shingrix    The above risks/problems have been discussed with the patient.  Pertinent information has been shared with the patient in the After Visit Summary.  An After Visit Summary and PPPS were made available to the patient.    Follow Up:   Next Medicare Wellness visit to be scheduled in 1 year.     Assessment & Plan  Encounter for subsequent annual wellness visit (AWV) in Medicare patient    ACP (advance care planning)    Hypothyroidism, unspecified type                         Follow Up:   No follow-ups on file.

## 2024-07-30 NOTE — PROGRESS NOTES
Subjective   The ABCs of the Annual Wellness Visit  Medicare Wellness Visit      Lizzie Campos is a 53 y.o. patient who presents for a Medicare Wellness Visit.    The following portions of the patient's history were reviewed and   updated as appropriate: allergies, current medications, past family history, past medical history, past social history, past surgical history, and problem list.    Compared to one year ago, the patient's physical   health is {better worse same:68334}.  Compared to one year ago, the patient's mental   health is {better worse same:13739}.    Recent Hospitalizations:  {Hospital Admission Status in the last 365 days:32452}    Current Medical Providers:  Patient Care Team:  Ethel Blackman APRN as PCP - General (Nurse Practitioner)    Outpatient Medications Prior to Visit   Medication Sig Dispense Refill    Airsupra 90-80 MCG/ACT aerosol       busPIRone (BUSPAR) 5 MG tablet Take 1 tablet by mouth Daily As Needed.      levothyroxine (Synthroid) 100 MCG tablet Take 1 tablet by mouth Daily. 90 tablet 2     No facility-administered medications prior to visit.     No opioid medication identified on active medication list. I have reviewed chart for other potential  high risk medication/s and harmful drug interactions in the elderly.      Aspirin is not on active medication list.  Aspirin use is not indicated based on review of current medical condition/s. Risk of harm outweighs potential benefits.  .    Patient Active Problem List   Diagnosis    Infantile idiopathic scoliosis of thoracolumbar region    Anxiety    Establishing care with new doctor, encounter for    Vitamin B 12 deficiency    Irritable bowel syndrome with diarrhea    Hypothyroidism    Encounter for annual wellness visit (AWV) in Medicare patient    Tracheostomy care    Infantile idiopathic scoliosis    Chronic back pain    Pain of toe of left foot    Pain in left leg    Contusion of multiple sites of left leg    Acute  "cystitis with hematuria    Need for influenza vaccination    Flank pain    Encounter for screening mammogram for breast cancer    Breast lesion    Family history of breast cancer    Pap smear for cervical cancer screening    Hematuria    Left flank pain    Kidney stone    Hiatal hernia    Intussusception    Intussusception of small bowel    Diaphragmatic hernia without obstruction and without gangrene    Intestine, malrotation    Tracheostomy in place    SOB (shortness of breath)    Tracheostomy dependent     Advance Care Planning (Click this link to access ACP Navigator)  Advance Directive is on file.  ACP discussion was held with the patient during this visit. Patient has an advance directive in EMR which is still valid.         Objective   Vitals:    24 1533   Weight: 69.4 kg (153 lb)   Height: 149.9 cm (59.02\")       Estimated body mass index is 30.89 kg/m² as calculated from the following:    Height as of this encounter: 149.9 cm (59.02\").    Weight as of this encounter: 69.4 kg (153 lb).             Does the patient have evidence of cognitive impairment? No                                                                                               Health  Risk Assessment    Smoking Status:  Social History     Tobacco Use   Smoking Status Never    Passive exposure: Never   Smokeless Tobacco Never     Alcohol Consumption:  Social History     Substance and Sexual Activity   Alcohol Use Not Currently    Comment: QUIT DRINKING IN      Fall Risk Screen:  STEADI Fall Risk Assessment has not been completed.    Depression Screenin/30/2024     3:39 PM   PHQ-2/PHQ-9 Depression Screening   Little Interest or Pleasure in Doing Things 0-->not at all   Feeling Down, Depressed or Hopeless 1-->several days   PHQ-9: Brief Depression Severity Measure Score 1     Health Habits and Functional and Cognitive Screenin/30/2024     3:36 PM   Functional & Cognitive Status   Do you have difficulty preparing " food and eating? No   Do you have difficulty bathing yourself, getting dressed or grooming yourself? No   Do you have difficulty using the toilet? No   Do you have difficulty moving around from place to place? No   Do you have trouble with steps or getting out of a bed or a chair? Yes   Current Diet Well Balanced Diet   Dental Exam Not up to date   Eye Exam Up to date   Exercise (times per week) 0 times per week   Current Exercises Include No Regular Exercise   Do you need help using the phone?  No   Are you deaf or do you have serious difficulty hearing?  No   Do you need help to go to places out of walking distance? Yes   Do you need help shopping? Yes   Do you need help preparing meals?  Yes   Do you need help with housework?  Yes   Do you need help with laundry? No   Do you need help taking your medications? No   Do you need help managing money? No   Do you ever drive or ride in a car without wearing a seat belt? No   Have you felt unusual stress, anger or loneliness in the last month? Yes   Who do you live with? Alone   If you need help, do you have trouble finding someone available to you? No   Have you been bothered in the last four weeks by sexual problems? No   Do you have difficulty concentrating, remembering or making decisions? No             Age-appropriate Screening Schedule:  Refer to the list below for future screening recommendations based on patient's age, sex and/or medical conditions. Orders for these recommended tests are listed in the plan section. The patient has been provided with a written plan.    Health Maintenance List  Health Maintenance   Topic Date Due    ZOSTER VACCINE (1 of 2) Never done    ANNUAL WELLNESS VISIT  03/06/2024    INFLUENZA VACCINE  08/01/2024    BMI FOLLOWUP  11/03/2024    MAMMOGRAM  12/27/2024    TDAP/TD VACCINES (2 - Td or Tdap) 12/29/2030    COLORECTAL CANCER SCREENING  02/09/2031    HEPATITIS C SCREENING  Completed    COVID-19 Vaccine  Completed    Pneumococcal  Vaccine 0-64  Aged Out                                                                                                                                                CMS Preventative Services Quick Reference  Risk Factors Identified During Encounter  {Medicare Wellness Risk Factors:50949}    The above risks/problems have been discussed with the patient.  Pertinent information has been shared with the patient in the After Visit Summary.  An After Visit Summary and PPPS were made available to the patient.    Follow Up:{Wrapup  Review (Popup)  Advance Care Planning  Labs  CC  Problem List  Visit Diagnosis  Medications  Result Review  Imaging  Health Maintenance  Quality  BestPractice  SmartSets  SnapShot  Encounters  Notes  Media  Procedures :23}   Next Medicare Wellness visit to be scheduled in 1 year.     Assessment & Plan               Follow Up:{Wrapup  Review (Popup)  Advance Care Planning  Labs  CC  Problem List  Visit Diagnosis  Medications  Result Review  Imaging  Health Maintenance  Quality  BestPractice  SmartSets  SnapShot  Encounters  Notes  Media  Procedures :23}   No follow-ups on file.

## 2024-07-30 NOTE — PROGRESS NOTES
"Chief Complaint  Medicare Wellness-subsequent        Lizzie Campos presents to [facility]ICINE        Subjective             {Problem List  Visit Diagnosis   Encounters  Notes  Medications  Labs  Result Review Imaging  Media :23}             Objective         Vital Signs:     Ht 149.9 cm (59.02\")   Wt 69.4 kg (153 lb)   BMI 30.89 kg/m²       Physical Exam           History of Present Illness      Patient Active Problem List   Diagnosis    Infantile idiopathic scoliosis of thoracolumbar region    Anxiety    Establishing care with new doctor, encounter for    Vitamin B 12 deficiency    Irritable bowel syndrome with diarrhea    Hypothyroidism    Encounter for annual wellness visit (AWV) in Medicare patient    Tracheostomy care    Infantile idiopathic scoliosis    Chronic back pain    Pain of toe of left foot    Pain in left leg    Contusion of multiple sites of left leg    Acute cystitis with hematuria    Need for influenza vaccination    Flank pain    Encounter for screening mammogram for breast cancer    Breast lesion    Family history of breast cancer    Pap smear for cervical cancer screening    Hematuria    Left flank pain    Kidney stone    Hiatal hernia    Intussusception    Intussusception of small bowel    Diaphragmatic hernia without obstruction and without gangrene    Intestine, malrotation    Tracheostomy in place    SOB (shortness of breath)    Tracheostomy dependent         Past Medical History:   Diagnosis Date    Anxiety     Arthritis     Depression     Encounter for hepatitis C screening test for low risk patient 02/28/2022    Headache     Hx of hepatitis C     NEGATIVE SINCE 2005 WITH TREATMENT     Hypothyroidism     Low back pain           Family History   Problem Relation Age of Onset    Heart disease Mother     Breast cancer Mother     Cancer Mother     Thyroid disease Mother     Asthma Mother     Arthritis Mother     Heart disease Father     " "Hypertension Father     Diabetes Father     Cancer Father     Alcohol abuse Brother     Drug abuse Brother     Breast cancer Paternal Aunt           Past Surgical History:   Procedure Laterality Date     SECTION      HERNIA REPAIR      HYSTERECTOMY      KIDNEY STONE SURGERY            Social History     Socioeconomic History    Marital status: Single    Number of children: 1    Years of education: 18   Tobacco Use    Smoking status: Never     Passive exposure: Never    Smokeless tobacco: Never   Vaping Use    Vaping status: Former    Start date: 2021    Quit date: 2021    Substances: THC    Devices: Disposable   Substance and Sexual Activity    Alcohol use: Not Currently     Comment: QUIT DRINKING IN     Drug use: Not Currently     Types: Marijuana     Comment: HAD MEDICAL CARD TO SMOKE  21    Sexual activity: Not Currently                    Result Review :{Labs  Result Review  Imaging  Med Tab  Media  Procedures :23}       {The following data was reviewed by (Optional):10426}      {Ambulatory Labs (Optional):09933}      {Data reviewed (Optional):09523:::1}                               Assessment and Plan {CC Problem List  Visit Diagnosis   ROS  Review (Popup)  Health Maintenance  Quality  BestPractice  Medications  SmartSets  SnapShot Encounters  Media :23}     There are no diagnoses linked to this encounter.    {Time Spent (Optional):30916}      Lizzie Campos  reports that she has never smoked. She has never been exposed to tobacco smoke. She has never used smokeless tobacco. I have educated her on the risk of diseases from using tobacco products such as {Tobacco Cessation Diseases:58840::\"cancer\",\"COPD\",\"heart disease\"}.     I advised her to quit and she is {Willing/Not Willing to Quit Tobacco Products:83734}.    I spent {Time Spent Tobacco :31721} minutes counseling the patient.               Follow Up {Instructions Charge Capture  Follow-up Communications " :23}      No follow-ups on file.      Patient was given instructions and counseling regarding her condition or for health maintenance advice. Please see specific information pulled into the AVS if appropriate.     Ethel Blackman, APRN7/30/202415:40 EDT  This note has been electronically signed

## 2024-08-27 ENCOUNTER — OFFICE VISIT (OUTPATIENT)
Dept: CARDIOLOGY | Facility: CLINIC | Age: 54
End: 2024-08-27
Payer: MEDICARE

## 2024-08-27 ENCOUNTER — OUTSIDE FACILITY SERVICE (OUTPATIENT)
Dept: CARDIOLOGY | Facility: CLINIC | Age: 54
End: 2024-08-27
Payer: MEDICARE

## 2024-08-27 ENCOUNTER — PATIENT ROUNDING (BHMG ONLY) (OUTPATIENT)
Dept: CARDIOLOGY | Facility: CLINIC | Age: 54
End: 2024-08-27

## 2024-08-27 VITALS
HEIGHT: 59 IN | WEIGHT: 149 LBS | DIASTOLIC BLOOD PRESSURE: 61 MMHG | SYSTOLIC BLOOD PRESSURE: 122 MMHG | BODY MASS INDEX: 30.04 KG/M2 | HEART RATE: 69 BPM | OXYGEN SATURATION: 98 %

## 2024-08-27 DIAGNOSIS — E03.9 HYPOTHYROIDISM, UNSPECIFIED TYPE: ICD-10-CM

## 2024-08-27 DIAGNOSIS — F32.A ANXIETY AND DEPRESSION: ICD-10-CM

## 2024-08-27 DIAGNOSIS — E66.09 CLASS 1 OBESITY DUE TO EXCESS CALORIES WITHOUT SERIOUS COMORBIDITY WITH BODY MASS INDEX (BMI) OF 30.0 TO 30.9 IN ADULT: ICD-10-CM

## 2024-08-27 DIAGNOSIS — F41.9 ANXIETY AND DEPRESSION: ICD-10-CM

## 2024-08-27 DIAGNOSIS — R06.02 SHORTNESS OF BREATH: Primary | ICD-10-CM

## 2024-08-27 DIAGNOSIS — Z00.00 ENCOUNTER FOR PREVENTIVE CARE: ICD-10-CM

## 2024-08-27 PROCEDURE — 93306 TTE W/DOPPLER COMPLETE: CPT | Performed by: INTERNAL MEDICINE

## 2024-08-27 PROCEDURE — 93356 MYOCRD STRAIN IMG SPCKL TRCK: CPT | Performed by: INTERNAL MEDICINE

## 2024-08-27 NOTE — PROGRESS NOTES
A My-Chart message has been sent to the patient for PATIENT ROUNDING with [facility]

## 2024-10-03 ENCOUNTER — TELEPHONE (OUTPATIENT)
Dept: FAMILY MEDICINE CLINIC | Facility: CLINIC | Age: 54
End: 2024-10-03
Payer: MEDICARE

## 2024-10-03 NOTE — TELEPHONE ENCOUNTER
Spoke with pt in regards, advised she would need to be seen and due to no available openings she would need to go to an urgent care.

## 2024-10-03 NOTE — TELEPHONE ENCOUNTER
Caller: Lizzie Campos    Relationship: Self    Best call back number: [phone]    What medication are you requesting: ANTIBIOTIC     What are your current symptoms: URINE SMELLS BAD, URGENT URINATION WITH VERY LITTLE URINE    How long have you been experiencing symptoms: 3 DAYS    Have you had these symptoms before:    [x] Yes  [] No    Have you been treated for these symptoms before:   [x] Yes  [] No    If a prescription is needed, what is your preferred pharmacy and phone number: [facility] [address] [facility] [address] [facility] [phone] [facility] [phone]      Additional notes:

## 2024-10-09 ENCOUNTER — HOSPITAL ENCOUNTER (OUTPATIENT)
Dept: CT IMAGING | Facility: HOSPITAL | Age: 54
Discharge: HOME | End: 2024-10-09
Admitting: THORACIC SURGERY (CARDIOTHORACIC VASCULAR SURGERY)
Payer: MEDICARE

## 2024-10-09 DIAGNOSIS — Q79.0 MORGAGNI HERNIA: ICD-10-CM

## 2024-10-09 PROCEDURE — 71250 CT THORAX DX C-: CPT

## 2024-10-16 ENCOUNTER — OFFICE VISIT (OUTPATIENT)
Dept: SURGERY | Facility: CLINIC | Age: 54
End: 2024-10-16
Payer: MEDICARE

## 2024-10-16 VITALS
OXYGEN SATURATION: 98 % | SYSTOLIC BLOOD PRESSURE: 137 MMHG | HEIGHT: 59 IN | BODY MASS INDEX: 29.65 KG/M2 | DIASTOLIC BLOOD PRESSURE: 81 MMHG | WEIGHT: 147.1 LBS | HEART RATE: 80 BPM

## 2024-10-16 DIAGNOSIS — Q39.0 ESOPHAGEAL ATRESIA: Primary | ICD-10-CM

## 2024-10-16 PROCEDURE — 1159F MED LIST DOCD IN RCRD: CPT | Performed by: THORACIC SURGERY (CARDIOTHORACIC VASCULAR SURGERY)

## 2024-10-16 PROCEDURE — 99213 OFFICE O/P EST LOW 20 MIN: CPT | Performed by: THORACIC SURGERY (CARDIOTHORACIC VASCULAR SURGERY)

## 2024-10-16 PROCEDURE — 1160F RVW MEDS BY RX/DR IN RCRD: CPT | Performed by: THORACIC SURGERY (CARDIOTHORACIC VASCULAR SURGERY)

## 2024-10-16 NOTE — LETTER
October 16, 2024     YEIMY Claire  [address]  [address]  [place] [address]    Patient: Lizzie Campos   YOB: 1970   Date of Visit: 10/16/2024     Dear YEIMY Claire:       Thank you for referring Lizzie Campos to me for evaluation. Below are the relevant portions of my assessment and plan of care.    If you have questions, please do not hesitate to call me. I look forward to following Lizzie along with you.         Sincerely,        Magali Nava MD        CC: Ferdinand Cohen MD

## 2024-10-16 NOTE — LETTER
October 21, 2024     YEIMY Claire  [address]  [address]  [place] [address]    Patient: Lizzie Campos   YOB: 1970   Date of Visit: 10/16/2024       Dear YEIMY Claire,    Lizzie Campos was in my office today. Below are the relevant portions of my assessment and plan of care.           If you have questions, please do not hesitate to call me. I look forward to following Lizzie along with you.         Sincerely,        Magali Nava MD        CC: Rj Aviles MD

## 2024-10-21 PROBLEM — Q39.0 ESOPHAGEAL ATRESIA: Status: ACTIVE | Noted: 2024-10-21

## 2024-10-21 NOTE — PROGRESS NOTES
"Chief Complaint  Morgagni Hernia (6 mon f/u CT Chest)    Subjective        Lizzie Campos presents to [facility] THORAC[facility] SURG[facility]  History of Present Illness  Ms. Campos is a pleasant 54-year-old lady with a history of a congenital esophageal atresia/tracheoesophageal fistula.  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  She has had a complex repair of her esophagus as an infant.  Objective   Vital Signs:  /81 (BP Location: Left arm, Patient Position: Sitting, Cuff Size: Large Adult)   Pulse 80   Ht 149.9 cm (59\")   Wt 66.7 kg (147 lb 1.6 oz)   SpO2 98%   BMI 29.71 kg/m²   Estimated body mass index is 29.71 kg/m² as calculated from the following:    Height as of this encounter: 149.9 cm (59\").    Weight as of this encounter: 66.7 kg (147 lb 1.6 oz).            Physical Exam  Vitals and nursing note reviewed.   Constitutional:       Appearance: She is well-developed.   HENT:      Head: Normocephalic and atraumatic.      Nose: Nose normal.   Eyes:      Conjunctiva/sclera: Conjunctivae normal.   Cardiovascular:      Rate and Rhythm: Normal rate.   Pulmonary:      Effort: Pulmonary effort is normal.   Abdominal:      Palpations: Abdomen is soft.   Musculoskeletal:      Cervical back: Neck supple.   Skin:     General: Skin is warm and dry.   Neurological:      Mental Status: She is alert and oriented to person, place, and time.   Psychiatric:         Behavior: Behavior normal.         Thought Content: Thought content normal.         Judgment: Judgment normal.        Result Review :          I have independently reviewed the CT of the chest performed on 10/9/2024 which demonstrates reconstruction of the esophagus with loops of intestine in the thorax anterior to the heart.  No significant lung nodules.  No mediastinal or hilar lymphadenopathy.     Assessment and Plan   Diagnoses and all orders for this visit:    1. Esophageal atresia (Primary)      Ms. Campos is a pleasant " 54-year-old lady with reconstruction of her esophageal atresia with intestine which on CT of the abdomen pelvis appears to be a hernia, however, on CT of the chest this is a defect consistent with her prior repair.  I would not recommend any further CT imaging of this and I definitely would not recommend any surgical intervention for this.  She will plan to call the office if she has trouble or questions.     I spent 20 minutes caring for Lizzie on this date of service. This time includes time spent by me in the following activities:preparing for the visit, reviewing tests, obtaining and/or reviewing a separately obtained history, performing a medically appropriate examination and/or evaluation , counseling and educating the patient/family/caregiver, ordering medications, tests, or procedures, documenting information in the medical record, and independently interpreting results and communicating that information with the patient/family/caregiver  Follow Up   No follow-ups on file.  Patient was given instructions and counseling regarding her condition or for health maintenance advice. Please see specific information pulled into the AVS if appropriate.

## 2024-10-24 ENCOUNTER — OFFICE VISIT (OUTPATIENT)
Dept: FAMILY MEDICINE CLINIC | Facility: CLINIC | Age: 54
End: 2024-10-24
Payer: MEDICARE

## 2024-10-24 ENCOUNTER — PATIENT ROUNDING (BHMG ONLY) (OUTPATIENT)
Dept: FAMILY MEDICINE CLINIC | Facility: CLINIC | Age: 54
End: 2024-10-24
Payer: MEDICARE

## 2024-10-24 VITALS
TEMPERATURE: 98.6 F | SYSTOLIC BLOOD PRESSURE: 145 MMHG | HEART RATE: 85 BPM | WEIGHT: 147 LBS | OXYGEN SATURATION: 98 % | HEIGHT: 59 IN | DIASTOLIC BLOOD PRESSURE: 84 MMHG | BODY MASS INDEX: 29.64 KG/M2

## 2024-10-24 DIAGNOSIS — R73.09 ELEVATED HEMOGLOBIN A1C: ICD-10-CM

## 2024-10-24 DIAGNOSIS — Z00.00 PREVENTATIVE HEALTH CARE: ICD-10-CM

## 2024-10-24 DIAGNOSIS — E53.8 VITAMIN B 12 DEFICIENCY: ICD-10-CM

## 2024-10-24 DIAGNOSIS — E78.2 MIXED HYPERLIPIDEMIA: ICD-10-CM

## 2024-10-24 DIAGNOSIS — N20.0 KIDNEY STONE: ICD-10-CM

## 2024-10-24 DIAGNOSIS — E66.3 OVERWEIGHT WITH BODY MASS INDEX (BMI) OF 29 TO 29.9 IN ADULT: ICD-10-CM

## 2024-10-24 DIAGNOSIS — N20.0 KIDNEY STONES: ICD-10-CM

## 2024-10-24 DIAGNOSIS — Z23 NEED FOR INFLUENZA VACCINATION: Primary | ICD-10-CM

## 2024-10-24 DIAGNOSIS — Z93.0 TRACHEOSTOMY IN PLACE: ICD-10-CM

## 2024-10-24 DIAGNOSIS — Z76.89 ESTABLISHING CARE WITH NEW DOCTOR, ENCOUNTER FOR: ICD-10-CM

## 2024-10-24 PROCEDURE — 90656 IIV3 VACC NO PRSV 0.5 ML IM: CPT | Performed by: NURSE PRACTITIONER

## 2024-10-24 PROCEDURE — G0008 ADMIN INFLUENZA VIRUS VAC: HCPCS | Performed by: NURSE PRACTITIONER

## 2024-10-24 PROCEDURE — 99214 OFFICE O/P EST MOD 30 MIN: CPT | Performed by: NURSE PRACTITIONER

## 2024-10-24 PROCEDURE — 1125F AMNT PAIN NOTED PAIN PRSNT: CPT | Performed by: NURSE PRACTITIONER

## 2024-10-24 RX ORDER — SULFAMETHOXAZOLE/TRIMETHOPRIM 800-160 MG
1 TABLET ORAL 2 TIMES DAILY
Qty: 14 TABLET | Refills: 0 | Status: SHIPPED | OUTPATIENT
Start: 2024-10-24 | End: 2024-10-31

## 2024-10-24 NOTE — PROGRESS NOTES
"    Lizzie Campos is a 54 y.o. female.     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  I am setting her up for first urology    Patient goes to endocrinology for her thyroid and he does have some test ordered in a few weeks    Patient has had a lot of surgeries on abdomen and GI system.  She does get a lot of UTIs due to some of her previous surgeries request that I call in an antibiotic to have at home which I will do    She is a prediabetic with A1c of 5.7 we will get some fasting labs when she comes in for her labs for endocrinology    Weight is 147 with a BMI of 29.7.  She has had 3 COVID vaccines is up-to-date on eye exam.  I am ordering her mammogram at [facility] which she will schedule.  She has had hysterectomy no longer does Pap smears          Fasting blood work  Urology referral  Mammogram at [facility]  Bactrim DS twice daily x 7 days for any UTIs  Diet and exercise  Follow-up 6 months       The following portions of the patient's history were reviewed and updated as appropriate: allergies, current medications, past family history, past medical history, past social history, past surgical history, and problem list.    Vitals:    10/24/24 1300   BP: 145/84   Pulse: 85   Temp: 98.6 °F (37 °C)   TempSrc: Infrared   SpO2: 98%   Weight: 66.7 kg (147 lb)   Height: 149.9 cm (59.02\")     Body mass index is 29.67 kg/m².          Past Medical History:   Diagnosis Date    Anxiety     Arthritis     Depression     Encounter for " hepatitis C screening test for low risk patient 2022    Headache     Hx of hepatitis C     NEGATIVE SINCE 2005 WITH TREATMENT     Hypothyroidism     Low back pain     Need for influenza vaccination 10/21/2022     Past Surgical History:   Procedure Laterality Date     SECTION      HERNIA REPAIR      HYSTERECTOMY      KIDNEY STONE SURGERY       Family History   Problem Relation Age of Onset    Heart disease Mother     Breast cancer Mother     Cancer Mother     Thyroid disease Mother     Asthma Mother     Arthritis Mother     Heart disease Father     Hypertension Father     Diabetes Father     Cancer Father     Alcohol abuse Brother     Drug abuse Brother     Breast cancer Paternal Aunt      Immunization History   Administered Date(s) Administered    COVID-19 (MODERNA) 12YRS+ (SPIKEVAX) 2023    COVID-19 (MODERNA) 1st,2nd,3rd Dose Monovalent 2021, 2021, 2022    COVID-19 (MODERNA) Monovalent Original Booster 2022    COVID-19 (UNSPECIFIED) 2022    Flu Vaccine Intradermal Quad 18-64YR 2020    Fluzone  >6mos 10/24/2024    Fluzone (or Fluarix & Flulaval for VFC) >6mos 10/21/2022    Pneumococcal Polysaccharide (PPSV23) 2015    Tdap 2020       Orders Only on 2024   Component Date Value Ref Range Status    TSH 2024 1.570  0.450 - 4.500 uIU/mL Final    Free T4 2024 1.36  0.82 - 1.77 ng/dL Final         Review of Systems   Constitutional: Negative.    HENT: Negative.     Respiratory: Negative.     Cardiovascular: Negative.    Gastrointestinal: Negative.    Genitourinary: Negative.    Musculoskeletal: Negative.    Skin: Negative.    Neurological: Negative.    Psychiatric/Behavioral: Negative.         Objective   Physical Exam  Constitutional:       Appearance: Normal appearance.   HENT:      Head: Normocephalic.   Cardiovascular:      Rate and Rhythm: Normal rate and regular rhythm.      Pulses: Normal pulses.      Heart sounds: Normal heart  sounds.   Pulmonary:      Effort: Pulmonary effort is normal.      Breath sounds: Normal breath sounds.   Abdominal:      General: Bowel sounds are normal.   Musculoskeletal:         General: Normal range of motion.   Skin:     General: Skin is warm.   Neurological:      General: No focal deficit present.      Mental Status: She is alert and oriented to person, place, and time.   Psychiatric:         Mood and Affect: Mood normal.         Behavior: Behavior normal.         Procedures    Assessment & Plan   Diagnoses and all orders for this visit:    1. Need for influenza vaccination (Primary)  -     Fluzone >6mos    2. Vitamin B 12 deficiency    3. Preventative health care  -     CBC & Differential; Future    4. Elevated hemoglobin A1c  -     Comprehensive Metabolic Panel; Future  -     Hemoglobin A1c; Future    5. Mixed hyperlipidemia  -     Lipid Panel With LDL / HDL Ratio; Future    6. Kidney stones  -     Ambulatory Referral to Urology    7. Tracheostomy in place    8. Kidney stone    9. Establishing care with new doctor, encounter for    10. Overweight with body mass index (BMI) of 29 to 29.9 in adult    Other orders  -     sulfamethoxazole-trimethoprim (BACTRIM DS,SEPTRA DS) 800-160 MG per tablet; Take 1 tablet by mouth 2 (Two) Times a Day for 7 days.  Dispense: 14 tablet; Refill: 0           Current Outpatient Medications:     Airsupra 90-80 MCG/ACT aerosol, , Disp: , Rfl:     busPIRone (BUSPAR) 5 MG tablet, Take 1 tablet by mouth Daily As Needed., Disp: , Rfl:     levothyroxine (Synthroid) 100 MCG tablet, Take 1 tablet by mouth Daily., Disp: 90 tablet, Rfl: 2    NON FORMULARY, Take 1 Units by mouth 3 times a day. BC Aspirin pain relief ASA 845mg, Caffeine 65mg (Patient taking differently: Take 1 Units by mouth 2 (Two) Times a Day. BC Aspirin pain relief ASA 845mg, Caffeine 65mg), Disp: , Rfl:     sulfamethoxazole-trimethoprim (BACTRIM DS,SEPTRA DS) 800-160 MG per tablet, Take 1 tablet by mouth 2 (Two) Times a  Day for 7 days., Disp: 14 tablet, Rfl: 0           Lore Tirado, YEIMY 10/24/2024 13:34 EDT  This note has been electronically signed

## 2024-10-24 NOTE — PATIENT INSTRUCTIONS
Fasting blood work  Urology referral  Mammogram at [facility] DS twice daily x 7 days for any UTIs  Diet and exercise  Follow-up 6 months

## 2024-10-24 NOTE — PROGRESS NOTES
October 24, 2024    Hello, may I speak with Lizzie Campos?    My name is melissa      [facility]  [facility]  [address]  [place] [address].    Before we get started may I verify your date of birth? 1970    I am calling to officially welcome you to our practice and ask about your recent visit. Is this a good time to talk? yes    Tell me about your visit with us. What things went well?  visit went great        We're always looking for ways to make our patients' experiences even better. Do you have recommendations on ways we may improve?  no    Overall were you satisfied with your first visit to our practice? yes       I appreciate you taking the time to speak with me today. Is there anything else I can do for you? no      Thank you, and have a great day.

## 2024-10-28 ENCOUNTER — TRANSCRIBE ORDERS (OUTPATIENT)
Dept: ADMINISTRATIVE | Facility: HOSPITAL | Age: 54
End: 2024-10-28
Payer: MEDICARE

## 2024-10-28 DIAGNOSIS — Z12.31 SCREENING MAMMOGRAM FOR BREAST CANCER: Primary | ICD-10-CM

## 2024-11-05 LAB
T4 FREE SERPL-MCNC: 1.05 NG/DL (ref 0.82–1.77)
TSH SERPL DL<=0.005 MIU/L-ACNC: 1.29 UIU/ML (ref 0.45–4.5)

## 2024-11-11 ENCOUNTER — OFFICE VISIT (OUTPATIENT)
Dept: ENDOCRINOLOGY | Facility: CLINIC | Age: 54
End: 2024-11-11
Payer: MEDICARE

## 2024-11-11 ENCOUNTER — HOSPITAL ENCOUNTER (OUTPATIENT)
Dept: MAMMOGRAPHY | Facility: HOSPITAL | Age: 54
Discharge: HOME | End: 2024-11-11
Admitting: NURSE PRACTITIONER
Payer: MEDICARE

## 2024-11-11 VITALS
BODY MASS INDEX: 30.04 KG/M2 | HEIGHT: 59 IN | HEART RATE: 78 BPM | SYSTOLIC BLOOD PRESSURE: 108 MMHG | OXYGEN SATURATION: 95 % | WEIGHT: 149 LBS | DIASTOLIC BLOOD PRESSURE: 64 MMHG

## 2024-11-11 DIAGNOSIS — E66.811 CLASS 1 OBESITY WITH BODY MASS INDEX (BMI) OF 30.0 TO 30.9 IN ADULT, UNSPECIFIED OBESITY TYPE, UNSPECIFIED WHETHER SERIOUS COMORBIDITY PRESENT: ICD-10-CM

## 2024-11-11 DIAGNOSIS — E78.2 MIXED HYPERLIPIDEMIA: ICD-10-CM

## 2024-11-11 DIAGNOSIS — E28.39 PREMATURE OVARIAN FAILURE: ICD-10-CM

## 2024-11-11 DIAGNOSIS — E03.9 HYPOTHYROIDISM, UNSPECIFIED TYPE: Primary | ICD-10-CM

## 2024-11-11 DIAGNOSIS — Z12.31 SCREENING MAMMOGRAM FOR BREAST CANCER: ICD-10-CM

## 2024-11-11 DIAGNOSIS — R73.03 PREDIABETES: ICD-10-CM

## 2024-11-11 PROCEDURE — 99214 OFFICE O/P EST MOD 30 MIN: CPT | Performed by: INTERNAL MEDICINE

## 2024-11-11 PROCEDURE — 1159F MED LIST DOCD IN RCRD: CPT | Performed by: INTERNAL MEDICINE

## 2024-11-11 PROCEDURE — 77063 BREAST TOMOSYNTHESIS BI: CPT

## 2024-11-11 PROCEDURE — 77067 SCR MAMMO BI INCL CAD: CPT

## 2024-11-11 PROCEDURE — G2211 COMPLEX E/M VISIT ADD ON: HCPCS | Performed by: INTERNAL MEDICINE

## 2024-11-11 PROCEDURE — 1160F RVW MEDS BY RX/DR IN RCRD: CPT | Performed by: INTERNAL MEDICINE

## 2024-11-11 RX ORDER — LEVOTHYROXINE SODIUM 100 UG/1
100 TABLET ORAL DAILY
Qty: 90 TABLET | Refills: 2 | Status: SHIPPED | OUTPATIENT
Start: 2024-11-11

## 2024-11-11 NOTE — PROGRESS NOTES
-----------------------------------------------------------------  ENDOCRINE CLINIC NOTE  -----------------------------------------------------------------        PATIENT NAME: Lizzie Campos  PATIENT : 1970 AGE: 54 y.o.  MRN NUMBER: [id number]  PRIMARY CARE: Lore Tirado APRN    ==========================================================================    CHIEF COMPLAINT: Hypothyroidism  DATE OF SERVICE: 24    ==========================================================================    HPI / SUBJECTIVE    54 y.o. female is seen in the clinic today for hypothyroidism.  Known to have hypothyroidism since .  Current therapy: levothyroxine 100 mcg p.o. daily.  Patient is currently properly taking medication.  Family history significant for thyroid disorder, mother and paternal aunt.  Patient currently have tracheostomy tube in place given history of tracheomalacia.  Denied any thyroidectomy or any radiation exposure.  Patient recently had blood work done which showed mild worsening of prediabetes and have persistent hyperlipidemia.  Following cardiology in the clinic and had normal echocardiogram.  No family history of premature cardiovascular disease.    ==========================================================================                                                PAST MEDICAL HISTORY    Past Medical History:   Diagnosis Date    Anxiety     Arthritis     Depression     Encounter for hepatitis C screening test for low risk patient 2022    Headache     Hx of hepatitis C     NEGATIVE SINCE  WITH TREATMENT     Hypothyroidism     Low back pain     Need for influenza vaccination 10/21/2022       ==========================================================================    PAST SURGICAL HISTORY    Past Surgical History:   Procedure Laterality Date     SECTION      HERNIA REPAIR      HYSTERECTOMY      KIDNEY STONE SURGERY          ==========================================================================    FAMILY HISTORY    Family History   Problem Relation Age of Onset    Heart disease Mother     Breast cancer Mother     Cancer Mother     Thyroid disease Mother     Asthma Mother     Arthritis Mother     Heart disease Father     Hypertension Father     Diabetes Father     Cancer Father     Alcohol abuse Brother     Drug abuse Brother     Breast cancer Paternal Aunt        ==========================================================================    SOCIAL HISTORY    Social History     Socioeconomic History    Marital status: Single    Number of children: 1    Years of education: 18   Tobacco Use    Smoking status: Never     Passive exposure: Never    Smokeless tobacco: Never   Vaping Use    Vaping status: Former    Start date: 2/1/2021    Quit date: 4/1/2021    Substances: THC    Devices: Disposable   Substance and Sexual Activity    Alcohol use: Not Currently     Comment: QUIT DRINKING IN 2013    Drug use: Not Currently     Types: Marijuana     Comment: HAD MEDICAL CARD TO SMOKE  11/2/21    Sexual activity: Not Currently       ==========================================================================    MEDICATIONS      Current Outpatient Medications:     Airsupra 90-80 MCG/ACT aerosol, , Disp: , Rfl:     busPIRone (BUSPAR) 5 MG tablet, Take 1 tablet by mouth Daily As Needed., Disp: , Rfl:     levothyroxine (Synthroid) 100 MCG tablet, Take 1 tablet by mouth Daily., Disp: 90 tablet, Rfl: 2    NON FORMULARY, Take 1 Units by mouth 3 times a day. BC Aspirin pain relief ASA 845mg, Caffeine 65mg (Patient taking differently: Take 1 Units by mouth 2 (Two) Times a Day. BC Aspirin pain relief ASA 845mg, Caffeine 65mg), Disp: , Rfl:     ==========================================================================    ALLERGIES    Allergies   Allergen Reactions    Penicillins Hives        ==========================================================================    OBJECTIVE    Vitals:    11/11/24 1320   BP: 108/64   Pulse: 78   SpO2: 95%     Body mass index is 30.07 kg/m².     General: Alert, cooperative, no acute distress  Thyroid: Tracheostomy tube in place  Lungs: Harsh vesicular breathing    ==========================================================================    LAB EVALUATION    Lab Results   Component Value Date    GLUCOSE 96 11/04/2024    BUN 9 11/04/2024    CREATININE 0.64 11/04/2024    EGFRIFNONA 84 01/13/2022    EGFRIFAFRI 97 01/13/2022    BCR 14 11/04/2024    K 4.5 11/04/2024    CO2 25 11/04/2024    CALCIUM 9.0 11/04/2024    PROTENTOTREF 6.8 11/04/2024    ALBUMIN 4.3 11/04/2024    LABIL2 2.1 06/26/2023    AST 19 11/04/2024    ALT 21 11/04/2024    TRIG 143 11/04/2024    HDL 39 (L) 11/04/2024     (H) 11/04/2024     Lab Results   Component Value Date    HGBA1C 6.1 (H) 11/04/2024    HGBA1C 5.70 (H) 03/19/2024    HGBA1C 5.6 06/26/2023     Lab Results   Component Value Date    CREATININE 0.64 11/04/2024     Lab Results   Component Value Date    TSH 1.290 11/04/2024    FREET4 1.05 11/04/2024       ==========================================================================    ASSESSMENT AND PLAN    # Hypothyroidism  - Last lab work within normal limits, continue levothyroxine therapy without any changes, 100 mcg p.o. daily  - Repeat blood work back again in 6 months time    # Premature ovarian failure    # Obesity with BMI of 30.07, significant lifestyle modification counseled    # Prediabetes, mild worsening of A1c, repeat A1c in 6 months time, if patient have persistent worsening will plan for SGLT2 inhibitor therapy or GLP-1 RA    # Hyperlipidemia, discussed with patient about possibly starting statin therapy but patient deferred therapy for now, after detailed discussion she is agreeable for CT coronary calcium scoring, if elevated patient will be a candidate for statin  "therapy, this was discussed with patient in detail to which she was understanding    Return to clinic: 6 months    Entire assessment and plan was discussed and counseled the patient in detail to which patient verbalized understanding and agreed with care.  Answered all queries and concerns.    This note was created using voice recognition software and is inherently subject to errors Including those of syntax and \"sound-alike\" substitutions which may escape proofreading.  In such instances, original meaning may be extrapolated by contextual derivation.    Note: Portions of this note may have been copied from previous notes but documentation have been reviewed and edited as necessary to support clinical decision making for today's visit.    ==========================================================================    INFORMATION PROVIDED TO PATIENT    Patient Instructions   Please,    - Continue levothyroxine tablets 100 mcg by mouth daily at 6:00 AM  - Take it every day, on an empty stomach, 30 minutes before eating  - Avoid taking it with iron, calcium, other medications (blocks absorption), wait at least 4 hrs after taking levothyroxine to take these medications  - If you miss a pill, you can take 2 the next day and return to schedule from next day     - Please plan for CT coronary calcium scoring.  Will review the results and reevaluate the need for lipid lowering medication called statin therapy.    Repeat blood work and clinical follow-up in 6 months time.    Thank you for your visit today.    Blood work repeat in 3 months and in 6 months time.    If you have any questions or concerns please feel free to reach out of the office.       ==========================================================================  Kavon Cordova MD  Department of Endocrine, Diabetes and Metabolism  [facility], [place]  ==========================================================================  "

## 2024-11-11 NOTE — PATIENT INSTRUCTIONS
Please,    - Continue levothyroxine tablets 100 mcg by mouth daily at 6:00 AM  - Take it every day, on an empty stomach, 30 minutes before eating  - Avoid taking it with iron, calcium, other medications (blocks absorption), wait at least 4 hrs after taking levothyroxine to take these medications  - If you miss a pill, you can take 2 the next day and return to schedule from next day     - Please plan for CT coronary calcium scoring.  Will review the results and reevaluate the need for lipid lowering medication called statin therapy.    Repeat blood work and clinical follow-up in 6 months time.    Thank you for your visit today.    Blood work repeat in 3 months and in 6 months time.    If you have any questions or concerns please feel free to reach out of the office.

## 2025-04-24 ENCOUNTER — OFFICE VISIT (OUTPATIENT)
Dept: FAMILY MEDICINE CLINIC | Facility: CLINIC | Age: 55
End: 2025-04-24
Payer: MEDICARE

## 2025-04-24 VITALS
HEIGHT: 59 IN | WEIGHT: 145 LBS | TEMPERATURE: 97.3 F | HEART RATE: 79 BPM | OXYGEN SATURATION: 98 % | SYSTOLIC BLOOD PRESSURE: 117 MMHG | DIASTOLIC BLOOD PRESSURE: 79 MMHG | BODY MASS INDEX: 29.23 KG/M2

## 2025-04-24 DIAGNOSIS — E66.3 OVERWEIGHT WITH BODY MASS INDEX (BMI) OF 29 TO 29.9 IN ADULT: ICD-10-CM

## 2025-04-24 DIAGNOSIS — R73.09 ELEVATED HEMOGLOBIN A1C: ICD-10-CM

## 2025-04-24 DIAGNOSIS — E03.9 HYPOTHYROIDISM, UNSPECIFIED TYPE: ICD-10-CM

## 2025-04-24 DIAGNOSIS — E78.2 MIXED HYPERLIPIDEMIA: Primary | ICD-10-CM

## 2025-04-24 PROCEDURE — 99213 OFFICE O/P EST LOW 20 MIN: CPT | Performed by: NURSE PRACTITIONER

## 2025-04-24 PROCEDURE — 1125F AMNT PAIN NOTED PAIN PRSNT: CPT | Performed by: NURSE PRACTITIONER

## 2025-04-24 NOTE — PROGRESS NOTES
"    Lizzie Campos is a 54 y.o. female.     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  Patient did go see cardiology 1 time was told she really did not need to be seen anymore.  Her endocrinologist plans on doing a 2D echo and a calcium scan, patient was not really quite sure the reason    Her last blood sugar was 6.1  we will be rechecking some labs today she may need to go on a low-dose statin    She has no other complaints weight is 145 BMI 29.3.  She has had 3 COVID vaccines up-to-date on eye exam.  Her mammogram is due in November 2025 she has had hysterectomy no longer does Pap smears.  Her next colonoscopy is due in 2031            Blood work today  Keep appointments with endocrinology  Diet and exercise  Follow-up 6 months                   The following portions of the patient's history were reviewed and updated as appropriate: allergies, current medications, past family history, past medical history, past social history, past surgical history, and problem list.    Vitals:    04/24/25 1327   BP: 117/79   BP Location: Right arm   Patient Position: Sitting   Cuff Size: Adult   Pulse: 79   Temp: 97.3 °F (36.3 °C)   TempSrc: Infrared   SpO2: 98%   Weight: 65.8 kg (145 lb)   Height: 149.9 cm (59.02\")       Past Medical History:   Diagnosis Date    Anxiety     Arthritis     Depression     Encounter for hepatitis C screening test for low risk patient 02/28/2022    Headache     History of medical problems 1970    Trach/defected wind pipe    Hx of hepatitis C     NEGATIVE SINCE 2005 WITH TREATMENT     Hypothyroidism     Irritable bowel syndrome 2021    Low back pain     " Need for influenza vaccination 10/21/2022    Scoliosis 1970    Birth Defect     Past Surgical History:   Procedure Laterality Date     SECTION      COLONOSCOPY      Upper/Lower    HERNIA REPAIR      HYSTERECTOMY      INGUINAL HERNIA REPAIR      KIDNEY STONE SURGERY      TONSILLECTOMY  2006     Family History   Problem Relation Age of Onset    Heart disease Mother     Breast cancer Mother     Cancer Mother     Thyroid disease Mother     Asthma Mother     Arthritis Mother     Anemia Mother     Hypertension Mother     Kidney disease Mother     Obesity Mother     Heart disease Father     Hypertension Father     Diabetes Father     Cancer Father     Alcohol abuse Brother     Drug abuse Brother     Breast cancer Paternal Aunt     Thyroid disease Paternal Aunt     Diabetes Maternal Grandmother     Diabetes Paternal Grandmother      Immunization History   Administered Date(s) Administered    COVID-19 (MODERNA) 12YRS+ (SPIKEVAX) 2023    COVID-19 (MODERNA) 1st,2nd,3rd Dose Monovalent 2021, 2021, 2022    COVID-19 (MODERNA) Monovalent Original Booster 2022    COVID-19 (UNSPECIFIED) 2022    Flu Vaccine Intradermal Quad 18-64YR 2020    Fluzone  >6mos 10/24/2024    Fluzone (or Fluarix & Flulaval for VFC) >6mos 10/21/2022    Pneumococcal Polysaccharide (PPSV23) 2015    Tdap 2020       Orders Only on 2024   Component Date Value Ref Range Status    Free T4 2024 1.05  0.82 - 1.77 ng/dL Final    TSH 2024 1.290  0.450 - 4.500 uIU/mL Final         Review of Systems   Constitutional: Negative.    HENT: Negative.     Respiratory: Negative.     Cardiovascular: Negative.    Gastrointestinal: Negative.    Genitourinary: Negative.    Musculoskeletal: Negative.    Skin: Negative.    Neurological: Negative.    Psychiatric/Behavioral: Negative.         Objective   Physical Exam  Constitutional:       Appearance: Normal appearance.   HENT:      Head:  Normocephalic.   Cardiovascular:      Rate and Rhythm: Normal rate and regular rhythm.      Pulses: Normal pulses.      Heart sounds: Normal heart sounds.   Pulmonary:      Effort: Pulmonary effort is normal.      Breath sounds: Normal breath sounds.   Abdominal:      General: Bowel sounds are normal.   Musculoskeletal:         General: Normal range of motion.   Skin:     General: Skin is warm.   Neurological:      General: No focal deficit present.      Mental Status: She is alert and oriented to person, place, and time.   Psychiatric:         Mood and Affect: Mood normal.         Behavior: Behavior normal.         Procedures    Assessment & Plan   Diagnoses and all orders for this visit:    1. Mixed hyperlipidemia (Primary)  -     Lipid Panel With LDL / HDL Ratio; Future    2. Elevated hemoglobin A1c  -     Hemoglobin A1c; Future  -     Comprehensive Metabolic Panel; Future           Current Outpatient Medications:     Airsupra 90-80 MCG/ACT aerosol, , Disp: , Rfl:     busPIRone (BUSPAR) 5 MG tablet, Take 1 tablet by mouth Daily As Needed., Disp: , Rfl:     levothyroxine (Synthroid) 100 MCG tablet, Take 1 tablet by mouth Daily., Disp: 90 tablet, Rfl: 2    NON FORMULARY, Take 1 Units by mouth 3 times a day. BC Aspirin pain relief ASA 845mg, Caffeine 65mg (Patient taking differently: Take 1 Units by mouth 2 (Two) Times a Day. BC Aspirin pain relief ASA 845mg, Caffeine 65mg), Disp: , Rfl:            YEIMY Sepulveda 4/24/2025 14:51 EDT  This note has been electronically signed

## 2025-05-05 ENCOUNTER — HOSPITAL ENCOUNTER (OUTPATIENT)
Dept: CT IMAGING | Facility: HOSPITAL | Age: 55
Discharge: HOME | End: 2025-05-05
Payer: MEDICARE

## 2025-05-05 ENCOUNTER — LAB (OUTPATIENT)
Dept: LAB | Facility: HOSPITAL | Age: 55
End: 2025-05-05
Payer: MEDICARE

## 2025-05-05 ENCOUNTER — LAB (OUTPATIENT)
Dept: ENDOCRINOLOGY | Facility: CLINIC | Age: 55
End: 2025-05-05
Payer: MEDICARE

## 2025-05-05 DIAGNOSIS — E03.9 HYPOTHYROIDISM, UNSPECIFIED TYPE: ICD-10-CM

## 2025-05-05 DIAGNOSIS — E78.2 MIXED HYPERLIPIDEMIA: ICD-10-CM

## 2025-05-05 LAB
CHOLEST SERPL-MCNC: 185 MG/DL (ref 0–200)
HDLC SERPL-MCNC: 40 MG/DL (ref 40–60)
LDLC SERPL CALC-MCNC: 126 MG/DL (ref 0–100)
LDLC/HDLC SERPL: 3.12 {RATIO}
T4 FREE SERPL-MCNC: 1.32 NG/DL (ref 0.93–1.7)
TRIGL SERPL-MCNC: 102 MG/DL (ref 0–150)
TSH SERPL DL<=0.05 MIU/L-ACNC: 3.15 UIU/ML (ref 0.27–4.2)
VLDLC SERPL-MCNC: 19 MG/DL (ref 5–40)

## 2025-05-05 PROCEDURE — 75571 CT HRT W/O DYE W/CA TEST: CPT

## 2025-05-05 PROCEDURE — 36415 COLL VENOUS BLD VENIPUNCTURE: CPT

## 2025-05-05 PROCEDURE — 80061 LIPID PANEL: CPT | Performed by: INTERNAL MEDICINE

## 2025-05-05 PROCEDURE — 84439 ASSAY OF FREE THYROXINE: CPT

## 2025-05-05 PROCEDURE — 84443 ASSAY THYROID STIM HORMONE: CPT

## 2025-05-22 ENCOUNTER — OFFICE VISIT (OUTPATIENT)
Dept: ENDOCRINOLOGY | Facility: CLINIC | Age: 55
End: 2025-05-22
Payer: MEDICARE

## 2025-05-22 VITALS
BODY MASS INDEX: 28.83 KG/M2 | HEIGHT: 59 IN | WEIGHT: 143 LBS | SYSTOLIC BLOOD PRESSURE: 118 MMHG | HEART RATE: 76 BPM | OXYGEN SATURATION: 97 % | DIASTOLIC BLOOD PRESSURE: 60 MMHG

## 2025-05-22 DIAGNOSIS — E66.3 OVERWEIGHT: ICD-10-CM

## 2025-05-22 DIAGNOSIS — E03.9 HYPOTHYROIDISM, UNSPECIFIED TYPE: Primary | ICD-10-CM

## 2025-05-22 DIAGNOSIS — E78.2 MIXED HYPERLIPIDEMIA: ICD-10-CM

## 2025-05-22 DIAGNOSIS — R79.9 ABNORMAL FINDING OF BLOOD CHEMISTRY, UNSPECIFIED: ICD-10-CM

## 2025-05-22 DIAGNOSIS — R73.03 PREDIABETES: ICD-10-CM

## 2025-05-22 DIAGNOSIS — E28.39 PREMATURE OVARIAN FAILURE: ICD-10-CM

## 2025-05-22 RX ORDER — LEVOTHYROXINE SODIUM 100 UG/1
100 TABLET ORAL DAILY
Qty: 90 TABLET | Refills: 3 | Status: SHIPPED | OUTPATIENT
Start: 2025-05-22

## 2025-05-22 NOTE — PATIENT INSTRUCTIONS
Please,    - Continue levothyroxine tablets 100 mcg by mouth daily at 6:00 AM  - Take it every day, on an empty stomach, 30 minutes before eating  - Avoid taking it with iron, calcium, other medications (blocks absorption), wait at least 4 hrs after taking levothyroxine to take these medications  - If you miss a pill, you can take 2 the next day and return to schedule from next day     Repeat blood work in 6 months and in 12 months.    Clinical follow-up in 12 months time.    Thank you for your visit today.    If you have any questions or concerns please feel free to reach out of the office.

## 2025-05-22 NOTE — PROGRESS NOTES
-----------------------------------------------------------------  ENDOCRINE CLINIC NOTE  -----------------------------------------------------------------        PATIENT NAME: Lizzie Campos  PATIENT : 1970 AGE: 54 y.o.  MRN NUMBER: [id number]  PRIMARY CARE: Lore Tirado APRN    ==========================================================================    CHIEF COMPLAINT: Hypothyroidism  DATE OF SERVICE: 25    ==========================================================================    HPI / SUBJECTIVE    54 y.o. female is seen in the clinic today for hypothyroidism.  Known to have hypothyroidism since .  Current therapy: levothyroxine 100 mcg p.o. daily.  Patient is currently properly taking medication.  Family history significant for thyroid disorder, mother and paternal aunt.  Patient currently have tracheostomy tube in place given history of tracheomalacia.  Denied any thyroidectomy or any radiation exposure.  Currently had blood work done which showed normal thyroid function.  Patient also have history significant for hyperlipidemia and prediabetes.  Following cardiology in the clinic.  No family history of premature cardiovascular disease.    ==========================================================================                                                PAST MEDICAL HISTORY    Past Medical History:   Diagnosis Date    Anxiety     Arthritis     Depression     Encounter for hepatitis C screening test for low risk patient 2022    Headache     History of medical problems 1970    Trach/defected wind pipe    Hx of hepatitis C     NEGATIVE SINCE  WITH TREATMENT     Hypothyroidism     Irritable bowel syndrome     Low back pain     Need for influenza vaccination 10/21/2022    Scoliosis 1970    Birth Defect       ==========================================================================    PAST SURGICAL HISTORY    Past Surgical History:   Procedure Laterality  Date     SECTION      COLONOSCOPY      Upper/Lower    HERNIA REPAIR      HYSTERECTOMY      INGUINAL HERNIA REPAIR      KIDNEY STONE SURGERY      TONSILLECTOMY         ==========================================================================    FAMILY HISTORY    Family History   Problem Relation Age of Onset    Heart disease Mother     Breast cancer Mother     Cancer Mother     Thyroid disease Mother     Asthma Mother     Arthritis Mother     Anemia Mother     Hypertension Mother     Kidney disease Mother     Obesity Mother     Heart disease Father     Hypertension Father     Diabetes Father     Cancer Father     Alcohol abuse Brother     Drug abuse Brother     Breast cancer Paternal Aunt     Thyroid disease Paternal Aunt     Diabetes Maternal Grandmother     Diabetes Paternal Grandmother        ==========================================================================    SOCIAL HISTORY    Social History     Socioeconomic History    Marital status: Single    Number of children: 1    Years of education: 18   Tobacco Use    Smoking status: Never     Passive exposure: Never    Smokeless tobacco: Never   Vaping Use    Vaping status: Former    Start date: 2021    Quit date: 2021    Substances: THC    Devices: Disposable   Substance and Sexual Activity    Alcohol use: Not Currently     Comment: QUIT DRINKING IN     Drug use: Not Currently     Types: Marijuana     Comment: HAD MEDICAL CARD TO SMOKE  21    Sexual activity: Not Currently       ==========================================================================    MEDICATIONS      Current Outpatient Medications:     Airsupra 90-80 MCG/ACT aerosol, , Disp: , Rfl:     busPIRone (BUSPAR) 5 MG tablet, Take 1 tablet by mouth Daily As Needed., Disp: , Rfl:     levothyroxine (Synthroid) 100 MCG tablet, Take 1 tablet by mouth Daily., Disp: 90 tablet, Rfl: 3    NON FORMULARY, Take 1 Units by mouth 3 times a day. BC Aspirin pain relief ASA  845mg, Caffeine 65mg (Patient taking differently: Take 1 Units by mouth 2 (Two) Times a Day. BC Aspirin pain relief ASA 845mg, Caffeine 65mg), Disp: , Rfl:     ==========================================================================    ALLERGIES    Allergies   Allergen Reactions    Penicillins Hives       ==========================================================================    OBJECTIVE    Vitals:    05/22/25 1029   BP: 118/60   Pulse: 76   SpO2: 97%     Body mass index is 28.86 kg/m².     General: Alert, cooperative, no acute distress  Thyroid: Tracheostomy tube in place  Lungs: Harsh vesicular breathing    ==========================================================================    LAB EVALUATION    Lab Results   Component Value Date    GLUCOSE 104 (H) 04/29/2025    BUN 12 04/29/2025    CREATININE 0.73 04/29/2025    EGFRIFNONA 84 01/13/2022    EGFRIFAFRI 97 01/13/2022    BCR 16 04/29/2025    K 4.9 04/29/2025    CO2 24 04/29/2025    CALCIUM 9.6 04/29/2025    ALBUMIN 4.5 04/29/2025    AST 17 04/29/2025    ALT 19 04/29/2025    CHOL 185 05/05/2025    TRIG 102 05/05/2025    HDL 40 05/05/2025     (H) 05/05/2025     Lab Results   Component Value Date    HGBA1C 6.1 (H) 04/29/2025    HGBA1C 6.1 (H) 11/04/2024    HGBA1C 5.70 (H) 03/19/2024     Lab Results   Component Value Date    CREATININE 0.73 04/29/2025     Lab Results   Component Value Date    TSH 3.150 05/05/2025    FREET4 1.32 05/05/2025     ==========================================================================    CT CARDIAC CALCIUM SCORE WO DYE     5/5/2025 1:09 PM EDT     Indication: High lipidi panel.     Comparison: CT chest 10/9/2024     Technique: Multiple thin section CT axial images were obtained with prospective ECG-gating without intravenous contrast.     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     IMPRESSION:  Impression:  Negative examination. No identifiable atherosclerotic plaque.     Calcium Score Interpretation  0 -- Negative examination, no identifiable atherosclerotic plaque.  Very low risk of cardiac events in the next 5 years.  1-10 -- Minimal plaque burden.  Low risk of cardiac events in the next 5 years.  11- 100 -- Mild Plaque burden.  Mild risk of cardiac events in the next 5 years.  101 - 400 -- Moderate plaque burden.  Moderate risk of cardiac events in the next 5 years.  Over 400 -- Extensive plaque burden.  High risk of cardiac events in the next 5 years.        Electronically Signed: Danyelle Roca MD    5/5/2025 2:56 PM EDT    Workstation ID: [id number]    ==========================================================================    ASSESSMENT AND PLAN    # Hypothyroidism  - Lab work on 5/5/2025 showed normal thyroid function  - Continue levothyroxine 100 mcg p.o. daily  - Repeat blood work in 6 months and in 12 months time with clinical follow-up in 12 months    # Premature ovarian failure    # Overweight with BMI of 28.86, lifestyle modification encouraged    # Prediabetes, persistent A1c of 6.1, continue lifestyle modification encouraged, discussed with patient about possible metformin therapy, after discussing benefit and side effect patient deferred therapy but will plan for clinical monitoring of prediabetes on 6 monthly basis    # Hyperlipidemia, CT Coronary Calcium Scoring was -ve, there is no indication for statin therapy except that patient have prediabetes, benefit and side effect of statin therapy and possibly considering low-dose low intensity statin was discussed with patient but after discussion plan is to do just clinical monitoring and repeat blood work in 6 months in 12 months time    Return to clinic: 12 months    Entire assessment and plan was discussed and counseled the patient in detail to  "which patient verbalized understanding and agreed with care.  Answered all queries and concerns.    This note was created using voice recognition software and is inherently subject to errors Including those of syntax and \"sound-alike\" substitutions which may escape proofreading.  In such instances, original meaning may be extrapolated by contextual derivation.    Note: Portions of this note may have been copied from previous notes but documentation have been reviewed and edited as necessary to support clinical decision making for today's visit.    ==========================================================================    INFORMATION PROVIDED TO PATIENT    Patient Instructions   Please,    - Continue levothyroxine tablets 100 mcg by mouth daily at 6:00 AM  - Take it every day, on an empty stomach, 30 minutes before eating  - Avoid taking it with iron, calcium, other medications (blocks absorption), wait at least 4 hrs after taking levothyroxine to take these medications  - If you miss a pill, you can take 2 the next day and return to schedule from next day     Repeat blood work in 6 months and in 12 months.    Clinical follow-up in 12 months time.    Thank you for your visit today.    If you have any questions or concerns please feel free to reach out of the office.       ==========================================================================  Kavon Cordova MD  Department of Endocrine, Diabetes and Metabolism  [facility], [place]  ==========================================================================  "

## 2025-08-04 ENCOUNTER — OFFICE VISIT (OUTPATIENT)
Dept: FAMILY MEDICINE CLINIC | Facility: CLINIC | Age: 55
End: 2025-08-04
Payer: MEDICARE

## 2025-08-04 VITALS
OXYGEN SATURATION: 97 % | HEART RATE: 69 BPM | SYSTOLIC BLOOD PRESSURE: 121 MMHG | BODY MASS INDEX: 27.86 KG/M2 | DIASTOLIC BLOOD PRESSURE: 78 MMHG | WEIGHT: 138.2 LBS | TEMPERATURE: 97.9 F | HEIGHT: 59 IN

## 2025-08-04 DIAGNOSIS — E66.3 OVERWEIGHT WITH BODY MASS INDEX (BMI) OF 27 TO 27.9 IN ADULT: Primary | ICD-10-CM

## 2025-08-04 PROCEDURE — G0439 PPPS, SUBSEQ VISIT: HCPCS | Performed by: NURSE PRACTITIONER

## 2025-08-04 PROCEDURE — 96160 PT-FOCUSED HLTH RISK ASSMT: CPT | Performed by: NURSE PRACTITIONER

## 2025-08-04 PROCEDURE — 1170F FXNL STATUS ASSESSED: CPT | Performed by: NURSE PRACTITIONER

## 2025-08-04 PROCEDURE — 1126F AMNT PAIN NOTED NONE PRSNT: CPT | Performed by: NURSE PRACTITIONER
